# Patient Record
Sex: FEMALE | Race: OTHER | NOT HISPANIC OR LATINO | URBAN - METROPOLITAN AREA
[De-identification: names, ages, dates, MRNs, and addresses within clinical notes are randomized per-mention and may not be internally consistent; named-entity substitution may affect disease eponyms.]

---

## 2019-06-01 ENCOUNTER — INPATIENT (INPATIENT)
Facility: HOSPITAL | Age: 74
LOS: 6 days | Discharge: HOME | DRG: 247 | End: 2019-06-08
Attending: INTERNAL MEDICINE | Admitting: HOSPITALIST
Payer: MEDICARE

## 2019-06-01 VITALS
DIASTOLIC BLOOD PRESSURE: 97 MMHG | HEART RATE: 90 BPM | HEIGHT: 62 IN | WEIGHT: 126.99 LBS | SYSTOLIC BLOOD PRESSURE: 185 MMHG | OXYGEN SATURATION: 99 % | RESPIRATION RATE: 17 BRPM | TEMPERATURE: 98 F

## 2019-06-01 LAB
ALBUMIN SERPL ELPH-MCNC: 4.7 G/DL — SIGNIFICANT CHANGE UP (ref 3.3–5)
ALP SERPL-CCNC: 90 U/L — SIGNIFICANT CHANGE UP (ref 40–120)
ALT FLD-CCNC: 17 U/L — SIGNIFICANT CHANGE UP (ref 10–45)
ANION GAP SERPL CALC-SCNC: 16 MMOL/L — SIGNIFICANT CHANGE UP (ref 5–17)
AST SERPL-CCNC: 24 U/L — SIGNIFICANT CHANGE UP (ref 10–40)
BASOPHILS # BLD AUTO: 0 K/UL — SIGNIFICANT CHANGE UP (ref 0–0.2)
BASOPHILS NFR BLD AUTO: 0.2 % — SIGNIFICANT CHANGE UP (ref 0–2)
BILIRUB SERPL-MCNC: 0.4 MG/DL — SIGNIFICANT CHANGE UP (ref 0.2–1.2)
BUN SERPL-MCNC: 14 MG/DL — SIGNIFICANT CHANGE UP (ref 7–23)
CALCIUM SERPL-MCNC: 10.2 MG/DL — SIGNIFICANT CHANGE UP (ref 8.4–10.5)
CHLORIDE SERPL-SCNC: 101 MMOL/L — SIGNIFICANT CHANGE UP (ref 96–108)
CO2 SERPL-SCNC: 24 MMOL/L — SIGNIFICANT CHANGE UP (ref 22–31)
CREAT SERPL-MCNC: 0.81 MG/DL — SIGNIFICANT CHANGE UP (ref 0.5–1.3)
EOSINOPHIL # BLD AUTO: 0.2 K/UL — SIGNIFICANT CHANGE UP (ref 0–0.5)
EOSINOPHIL NFR BLD AUTO: 3.3 % — SIGNIFICANT CHANGE UP (ref 0–6)
GLUCOSE SERPL-MCNC: 117 MG/DL — HIGH (ref 70–99)
HCT VFR BLD CALC: 44.8 % — SIGNIFICANT CHANGE UP (ref 34.5–45)
HGB BLD-MCNC: 15.3 G/DL — SIGNIFICANT CHANGE UP (ref 11.5–15.5)
LYMPHOCYTES # BLD AUTO: 2.6 K/UL — SIGNIFICANT CHANGE UP (ref 1–3.3)
LYMPHOCYTES # BLD AUTO: 34.6 % — SIGNIFICANT CHANGE UP (ref 13–44)
MCHC RBC-ENTMCNC: 31.2 PG — SIGNIFICANT CHANGE UP (ref 27–34)
MCHC RBC-ENTMCNC: 34.1 GM/DL — SIGNIFICANT CHANGE UP (ref 32–36)
MCV RBC AUTO: 91.7 FL — SIGNIFICANT CHANGE UP (ref 80–100)
MONOCYTES # BLD AUTO: 0.4 K/UL — SIGNIFICANT CHANGE UP (ref 0–0.9)
MONOCYTES NFR BLD AUTO: 5.8 % — SIGNIFICANT CHANGE UP (ref 2–14)
NEUTROPHILS # BLD AUTO: 4.2 K/UL — SIGNIFICANT CHANGE UP (ref 1.8–7.4)
NEUTROPHILS NFR BLD AUTO: 56.1 % — SIGNIFICANT CHANGE UP (ref 43–77)
PLATELET # BLD AUTO: 212 K/UL — SIGNIFICANT CHANGE UP (ref 150–400)
POTASSIUM SERPL-MCNC: 3.8 MMOL/L — SIGNIFICANT CHANGE UP (ref 3.5–5.3)
POTASSIUM SERPL-SCNC: 3.8 MMOL/L — SIGNIFICANT CHANGE UP (ref 3.5–5.3)
PROT SERPL-MCNC: 8.5 G/DL — HIGH (ref 6–8.3)
RBC # BLD: 4.89 M/UL — SIGNIFICANT CHANGE UP (ref 3.8–5.2)
RBC # FLD: 12.2 % — SIGNIFICANT CHANGE UP (ref 10.3–14.5)
SODIUM SERPL-SCNC: 141 MMOL/L — SIGNIFICANT CHANGE UP (ref 135–145)
TROPONIN T, HIGH SENSITIVITY RESULT: 12 NG/L — SIGNIFICANT CHANGE UP (ref 0–51)
WBC # BLD: 7.5 K/UL — SIGNIFICANT CHANGE UP (ref 3.8–10.5)
WBC # FLD AUTO: 7.5 K/UL — SIGNIFICANT CHANGE UP (ref 3.8–10.5)

## 2019-06-01 PROCEDURE — 99285 EMERGENCY DEPT VISIT HI MDM: CPT | Mod: 25

## 2019-06-01 NOTE — ED PROVIDER NOTE - OBJECTIVE STATEMENT
75 yo female with hx of HTN HLD presenting with elevated blood pressure associated with tightness feeling in the neck associated with bilateral arm heaviness x 2 weeks. intermittent, randomly comes and goes.  patient was at [facility] and felt sensation today. +family hx of heart disease in mothers side.  currently asymptomatic.  after dinner today, pressure was 185 systolic which prompted her to come in.

## 2019-06-01 NOTE — ED ADULT NURSE NOTE - CHPI ED NUR SYMPTOMS NEG
no shortness of breath/no vomiting/no diaphoresis/no chest pain/no dizziness/no congestion/no syncope/no nausea/no back pain/no chills/no fever

## 2019-06-01 NOTE — ED PROVIDER NOTE - NS ED ROS FT
Constitutional: no fever  Eyes: no conjunctivitis  Ears: no ear pain   Nose: no nasal congestion, Mouth/Throat: +throat discomfort, Neck: no stiffness  Cardiovascular: no chest pain  Chest: no cough  Gastrointestinal: no abdominal pain, no vomiting and diarrhea  MSK: no joint pain  : no dysuria  Skin: no rash  Neuro: no LOC

## 2019-06-01 NOTE — ED ADULT NURSE NOTE - OBJECTIVE STATEMENT
75 yo F pmh of HTN dx 1 month ago, placed on losartan, metoprolol, hydrochlorothiazide ambulated to ED c/o HTN this morning.  As per pt, BP has been controlled with medication, but did not take normal dose this morning, took her AM medications later.  Pt states that she has a sensation of tightness on her neck and down her arms bilaterally since taking AM medications.   Denies HA, CP, back pain, SOB, fevers/chills, n/v/d, lightheadedness, dizziness, changes in vision, numbness, tingling, changes in urinary or bowel habits.  A&Ox4,  gross neuro intact, VSS, skin w/d/i.  Safety and comfort maintained. Family present at bedside. Will continue to monitor.

## 2019-06-01 NOTE — ED ADULT NURSE NOTE - NSIMPLEMENTINTERV_GEN_ALL_ED
Implemented All Universal Safety Interventions:  [place] to call system. Call bell, personal items and telephone within reach. Instruct patient to call for assistance. Room bathroom lighting operational. Non-slip footwear when patient is off stretcher. Physically safe environment: no spills, clutter or unnecessary equipment. Stretcher in lowest position, wheels locked, appropriate side rails in place.

## 2019-06-01 NOTE — ED PROVIDER NOTE - CLINICAL SUMMARY MEDICAL DECISION MAKING FREE TEXT BOX
73 yo female presenting with elevated high blood pressure associated with intermittent throat discomfort; currently asymptomatic;  will check labs troponin ekg to rule out signs of end organ damage

## 2019-06-01 NOTE — ED PROVIDER NOTE - PROGRESS NOTE DETAILS
Was asked to evaluate patient for CDU for stress this morning, pt troponins with elevation from 12-22, EKG nonischemic, discussed case with noctmaria luisaist Florence who recommended repeat trop 3 hrs after last trop to trnd, possible elevation from htn. discussed with resident Nubia. -Jannette Jones PA-C Spoke with Dr. Henriquez in regards to 3rd troponin back at 28, states pt unlikely to have stress today, most likely will be done on monday, or 24 hrs after first down trending troponin, does not recommend heparin at this time. discussed with resident Nubia. -Jannette Jones PA-C

## 2019-06-02 DIAGNOSIS — Z29.9 ENCOUNTER FOR PROPHYLACTIC MEASURES, UNSPECIFIED: ICD-10-CM

## 2019-06-02 DIAGNOSIS — E78.5 HYPERLIPIDEMIA, UNSPECIFIED: ICD-10-CM

## 2019-06-02 DIAGNOSIS — I16.0 HYPERTENSIVE URGENCY: ICD-10-CM

## 2019-06-02 DIAGNOSIS — R74.8 ABNORMAL LEVELS OF OTHER SERUM ENZYMES: ICD-10-CM

## 2019-06-02 DIAGNOSIS — I21.4 NON-ST ELEVATION (NSTEMI) MYOCARDIAL INFARCTION: ICD-10-CM

## 2019-06-02 DIAGNOSIS — Z98.890 OTHER SPECIFIED POSTPROCEDURAL STATES: Chronic | ICD-10-CM

## 2019-06-02 LAB
TROPONIN T, HIGH SENSITIVITY RESULT: 18 NG/L — SIGNIFICANT CHANGE UP (ref 0–51)
TROPONIN T, HIGH SENSITIVITY RESULT: 20 NG/L — SIGNIFICANT CHANGE UP (ref 0–51)
TROPONIN T, HIGH SENSITIVITY RESULT: 22 NG/L — SIGNIFICANT CHANGE UP (ref 0–51)
TROPONIN T, HIGH SENSITIVITY RESULT: 28 NG/L — SIGNIFICANT CHANGE UP (ref 0–51)

## 2019-06-02 PROCEDURE — 99223 1ST HOSP IP/OBS HIGH 75: CPT | Mod: AI,GC

## 2019-06-02 PROCEDURE — 99223 1ST HOSP IP/OBS HIGH 75: CPT

## 2019-06-02 RX ORDER — ASPIRIN/CALCIUM CARB/MAGNESIUM 324 MG
162 TABLET ORAL ONCE
Refills: 0 | Status: COMPLETED | OUTPATIENT
Start: 2019-06-02 | End: 2019-06-02

## 2019-06-02 RX ORDER — ASPIRIN/CALCIUM CARB/MAGNESIUM 324 MG
81 TABLET ORAL DAILY
Refills: 0 | Status: DISCONTINUED | OUTPATIENT
Start: 2019-06-02 | End: 2019-06-08

## 2019-06-02 RX ORDER — ATORVASTATIN CALCIUM 80 MG/1
20 TABLET, FILM COATED ORAL AT BEDTIME
Refills: 0 | Status: DISCONTINUED | OUTPATIENT
Start: 2019-06-02 | End: 2019-06-06

## 2019-06-02 RX ORDER — HYDROCHLOROTHIAZIDE 25 MG
25 TABLET ORAL DAILY
Refills: 0 | Status: DISCONTINUED | OUTPATIENT
Start: 2019-06-02 | End: 2019-06-04

## 2019-06-02 RX ORDER — LOSARTAN POTASSIUM 100 MG/1
100 TABLET, FILM COATED ORAL DAILY
Refills: 0 | Status: DISCONTINUED | OUTPATIENT
Start: 2019-06-02 | End: 2019-06-08

## 2019-06-02 RX ADMIN — ATORVASTATIN CALCIUM 20 MILLIGRAM(S): 80 TABLET, FILM COATED ORAL at 21:51

## 2019-06-02 RX ADMIN — Medication 25 MILLIGRAM(S): at 12:57

## 2019-06-02 RX ADMIN — Medication 162 MILLIGRAM(S): at 05:14

## 2019-06-02 RX ADMIN — Medication 81 MILLIGRAM(S): at 12:57

## 2019-06-02 RX ADMIN — LOSARTAN POTASSIUM 100 MILLIGRAM(S): 100 TABLET, FILM COATED ORAL at 12:57

## 2019-06-02 NOTE — H&P ADULT - NSHPSOCIALHISTORY_GEN_ALL_CORE
Patient is a retired nurse. She lives with her . She denies any history of smoking or recreational drug use. Social alcohol use.

## 2019-06-02 NOTE — H&P ADULT - NSHPREVIEWOFSYSTEMS_GEN_ALL_CORE
REVIEW OF SYSTEMS    CONSTITUTIONAL:  Denies fevers or chills   HEENT: Denies vision changes or nasal congestion   SKIN:  Denies rash or itching.  CARDIOVASCULAR:  Denies chest pain, DENITA  RESPIRATORY:  Denies shortness of breath, cough or sputum.  GASTROINTESTINAL:  Denies abdominal pain, nausea, vomiting, or diarrhea   GENITOURINARY:  Denies any hematuria, dysuria  NEUROLOGICAL:  Denies headache, dizziness, or near syncope, no weakness or numbness  MUSCULOSKELETAL:  Denies muscle or back pain   HEMATOLOGIC:  Denies anemia, bleeding or bruising.  LYMPHATICS:  Denies enlarged nodes.

## 2019-06-02 NOTE — ED ADULT NURSE REASSESSMENT NOTE - NS ED NURSE REASSESS COMMENT FT1
Recvd pt awake, alert and responsive to all stimuli.  no sob or respiratory distress noted.  pt remains hypertensive but denies any pain or dizziness at this time.  pt awaiting admitting tele bed with cardiac monitor in place.  will continue to monitor.

## 2019-06-02 NOTE — H&P ADULT - PROBLEM SELECTOR PLAN 2
- trops have trended from 12 --> 22 --> 28 --> 20  - see by cardiology Dr. Henriquez who thinks troponin is demand secondary to uncontrolled hypertension  - TTE ordered to evaluate LF function  - plans for stress tomorrow   - EKG without signs of active ischemia  - monitor on telemetry for now trops have trended from 12 --> 22 --> 28 --> 20  - seen by cardiology Dr. Henriquez who thinks troponin is demand secondary to uncontrolled hypertension  - TTE ordered to evaluate LF function  - plans for MPI tomorrow per Dr. Henriquez versus CT coronaries if unable to complete MPI   - EKG without signs of active ischemia  - monitor on telemetry for now - trops have trended from 12 --> 22 --> 28 --> 20 but did present with atypical chest pain with uptrending troponins   - seen by cardiology Dr. Henriquez who thinks troponin is demand secondary to uncontrolled hypertension  - TTE ordered to evaluate LF function  - will order CT coronaries and pending results will likely require cath on this admission   - EKG without signs of active ischemia   - monitor on telemetry for now

## 2019-06-02 NOTE — CONSULT NOTE ADULT - SUBJECTIVE AND OBJECTIVE BOX
MRN-[id number]    CHIEF COMPLAINT:  CP    HISTORY OF PRESENT ILLNESS:  YANIV JAQUEZ is a 74y Female patient with past medical history of HTN, HLD presenting with significant hypertension systolics >180s associated with chest aching. She missed her morning antihypertensive medications yesterday morning. She was recently diagnosed with hypertension late April 2019 and prescribed losartan 100 mg daily, HCTZ 12.5 mg daily, metoprolol succinate 25 mg daily with good control in the range of systolics 120-140s. Given her symptoms of chest discomfort, she was planned to see a cardiologist in the outpatient setting. hs-troponin 12, 22, 28. ECG benign. Asymptomatic at the time of evaluation other than being anxious about the whole ordeal. Cardiology consulted for further management.    Allergies  No Known Allergies    PAST MEDICAL & SURGICAL HISTORY:  Hypertension    FAMILY HISTORY:  No sudden premature cardiac death.     SOCIAL HISTORY:    Non-smoker    REVIEW OF SYSTEMS:  CONSTITUTIONAL: No fever, weight loss, or fatigue  EYES: No eye pain, visual disturbances  ENMT:  No difficulty hearing, tinnitus, vertigo  NECK: No pain or stiffness  RESPIRATORY: No cough, wheezing, chills. No SOB.   CARDIOVASCULAR: No palpitations, passing out, dizziness, or leg swelling. Positive chest aching.   GASTROINTESTINAL: No abdominal or epigastric pain. No nausea, vomiting, or hematemesis  NEUROLOGICAL: No headaches  SKIN: No itching, burning, rashes,  LYMPH Nodes: No enlarged glands  MUSCULOSKELETAL: No joint pain or swelling  PSYCHIATRIC: No depression, anxiety  HEME/LYMPH: No easy bruising    PHYSICAL EXAM:  Vital Signs Last 24 Hrs  T(C): 37 (02 Jun 2019 07:15), Max: 37 (01 Jun 2019 22:29)  T(F): 98.6 (02 Jun 2019 07:15), Max: 98.6 (01 Jun 2019 22:29)  HR: 51 (02 Jun 2019 07:15) (51 - 90)  BP: 192/82 (02 Jun 2019 07:15) (169/85 - 192/82)  RR: 16 (02 Jun 2019 07:15) (16 - 18)  SpO2: 98% (02 Jun 2019 07:15) (98% - 99%)    Appearance: Normal	  HEENT:   Normal oral mucosa  Lymphatic: No lymphadenopathy  Cardiovascular: Normal S1 S2, No edema  Respiratory: Lungs clear to auscultation	  Psychiatry: A & O x 3  Gastrointestinal:  Soft, Non-tender  Skin: No rashes, No ecchymoses	  Neurologic: Non-focal  Extremities: No clubbing, cyanosis or edema  Vascular: Peripheral pulses palpable 2+ bilaterally    LABS:	 	  CBC Full  -  ( 01 Jun 2019 23:07 )  WBC Count : 7.5 K/uL  Hemoglobin : 15.3 g/dL  Hematocrit : 44.8 %  Platelet Count - Automated : 212 K/uL  Mean Cell Volume : 91.7 fl  Mean Cell Hemoglobin : 31.2 pg  Mean Cell Hemoglobin Concentration : 34.1 gm/dL  Auto Neutrophil # : 4.2 K/uL  Auto Lymphocyte # : 2.6 K/uL  Auto Monocyte # : 0.4 K/uL  Auto Eosinophil # : 0.2 K/uL  Auto Basophil # : 0.0 K/uL  Auto Neutrophil % : 56.1 %  Auto Lymphocyte % : 34.6 %  Auto Monocyte % : 5.8 %  Auto Eosinophil % : 3.3 %  Auto Basophil % : 0.2 %    06-01    141  |  101  |  14  ----------------------------<  117<H>  3.8   |  24  |  0.81    Ca    10.2      01 Jun 2019 23:07    TPro  8.5<H>  /  Alb  4.7  /  TBili  0.4  /  DBili  x   /  AST  24  /  ALT  17  /  AlkPhos  90  06-01    TELEMETRY: 6/1/2019  NSR    CARDIAC TESTING/STUDIES:    N/A  	  ASSESSMENT/PLAN: 	   74y Female patient with past medical history of HTN, HLD presenting with hypertensive emergency and type II NSTEMI.     1) Type II NSTEMI   Secondary to uncontrolled HTN; symptoms resolve with improved BP.   CAD risk factors: HTN, HLD, age, family history     Pending ECHO to assess LEF, WMA, diastology, chamber quantification, valve pathology, etc.   Trend hs-troponin until peaked; nearly peaked now.   Ischemic evaluation planned; recommend pharmacologic MPI ususally 24-48 hours after the first downtrending troponin. Alternatively, despite her age, I do think she would be a good candidate for CTA coronaries if MPI is not done since she has a slow HR.   Continue medical management with aspirin 81 mg daily and atorvastatin 20 mg nightly.     2) HTN   Well controlled when on home medications.   Anginal pain with elevated pressures.     ·	Continue losartan 100 mg daily, HCTZ 12.5-25 mg daily +/- metoprolol succinate 25 mg daily (as she reports acid reflux and reports her PCP changed this medications recently). In any case, BB is not optimal BP medications.     3) HLD   ASCVD risk >7.5%    ·	Continue medical management with atorvastatin 20 mg nightly.     Florence Henriquez MD, MPH, ITZ  Cardiovascular Specialist Attending  [facility], [place]  C: [phone]  E: [email or web address]  (Cardiology Nocturnist cell number available 7 pm - 7 am every night; available daytime week days for follow-up only; daytime weekends covered by general cardiology consult service) MRN-[id number]    CHIEF COMPLAINT:  CP    HISTORY OF PRESENT ILLNESS:  YANIV JAQUEZ is a 74y Female patient with past medical history of HTN, HLD presenting with significant hypertension systolics >180s associated with chest aching. She missed her morning antihypertensive medications yesterday morning. She was recently diagnosed with hypertension late April 2019 and prescribed losartan 100 mg daily, HCTZ 12.5 mg daily, metoprolol succinate 25 mg daily with good control in the range of systolics 120-140s. Given her symptoms of chest discomfort, she was planned to see a cardiologist in the outpatient setting. hs-troponin 12, 22, 28. ECG benign. Asymptomatic at the time of evaluation other than being anxious about the whole ordeal. Cardiology consulted for further management.    Allergies  No Known Allergies    PAST MEDICAL & SURGICAL HISTORY:  Hypertension    FAMILY HISTORY:  No sudden premature cardiac death.     SOCIAL HISTORY:    Non-smoker    REVIEW OF SYSTEMS:  CONSTITUTIONAL: No fever, weight loss, or fatigue  EYES: No eye pain, visual disturbances  ENMT:  No difficulty hearing, tinnitus, vertigo  NECK: No pain or stiffness  RESPIRATORY: No cough, wheezing, chills. No SOB.   CARDIOVASCULAR: No palpitations, passing out, dizziness, or leg swelling. Positive chest aching.   GASTROINTESTINAL: No abdominal or epigastric pain. No nausea, vomiting, or hematemesis  NEUROLOGICAL: No headaches  SKIN: No itching, burning, rashes,  LYMPH Nodes: No enlarged glands  MUSCULOSKELETAL: No joint pain or swelling  PSYCHIATRIC: No depression, anxiety  HEME/LYMPH: No easy bruising    PHYSICAL EXAM:  Vital Signs Last 24 Hrs  T(C): 37 (02 Jun 2019 07:15), Max: 37 (01 Jun 2019 22:29)  T(F): 98.6 (02 Jun 2019 07:15), Max: 98.6 (01 Jun 2019 22:29)  HR: 51 (02 Jun 2019 07:15) (51 - 90)  BP: 192/82 (02 Jun 2019 07:15) (169/85 - 192/82)  RR: 16 (02 Jun 2019 07:15) (16 - 18)  SpO2: 98% (02 Jun 2019 07:15) (98% - 99%)    Appearance: Normal	  HEENT:   Normal oral mucosa  Lymphatic: No lymphadenopathy  Cardiovascular: Normal S1 S2, No edema  Respiratory: Lungs clear to auscultation	  Psychiatry: A & O x 3  Gastrointestinal:  Soft, Non-tender  Skin: No rashes, No ecchymoses	  Neurologic: Non-focal  Extremities: No clubbing, cyanosis or edema  Vascular: Peripheral pulses palpable 2+ bilaterally    LABS:	 	  CBC Full  -  ( 01 Jun 2019 23:07 )  WBC Count : 7.5 K/uL  Hemoglobin : 15.3 g/dL  Hematocrit : 44.8 %  Platelet Count - Automated : 212 K/uL  Mean Cell Volume : 91.7 fl  Mean Cell Hemoglobin : 31.2 pg  Mean Cell Hemoglobin Concentration : 34.1 gm/dL  Auto Neutrophil # : 4.2 K/uL  Auto Lymphocyte # : 2.6 K/uL  Auto Monocyte # : 0.4 K/uL  Auto Eosinophil # : 0.2 K/uL  Auto Basophil # : 0.0 K/uL  Auto Neutrophil % : 56.1 %  Auto Lymphocyte % : 34.6 %  Auto Monocyte % : 5.8 %  Auto Eosinophil % : 3.3 %  Auto Basophil % : 0.2 %    06-01    141  |  101  |  14  ----------------------------<  117<H>  3.8   |  24  |  0.81    Ca    10.2      01 Jun 2019 23:07    TPro  8.5<H>  /  Alb  4.7  /  TBili  0.4  /  DBili  x   /  AST  24  /  ALT  17  /  AlkPhos  90  06-01    TELEMETRY: 6/1/2019  NSR    CARDIAC TESTING/STUDIES:    N/A  	  ASSESSMENT/PLAN: 	   74y Female patient with past medical history of HTN, HLD presenting with hypertensive emergency and type II NSTEMI.     1) Type II NSTEMI   Secondary to uncontrolled HTN; symptoms resolve with improved BP.   CAD risk factors: HTN, HLD, age, family history     ·	Pending ECHO to assess LEF, WMA, diastology, chamber quantification, valve pathology, etc.   ·	Trend hs-troponin until peaked; nearly peaked now.   ·	Ischemic evaluation planned; recommend pharmacologic MPI ususally 24-48 hours after the first downtrending troponin. Alternatively, despite her age, I do think she would be a good candidate for CTA coronaries if MPI is not done since she has a slow HR.   ·	Continue medical management with aspirin 81 mg daily and atorvastatin 20 mg nightly.     2) HTN   Well controlled when on home medications.   Anginal pain with elevated pressures.     ·	Continue losartan 100 mg daily, HCTZ 12.5-25 mg daily +/- metoprolol succinate 25 mg daily (as she reports acid reflux and reports her PCP changed this medications recently). In any case, BB is not optimal BP medications.     3) HLD   ASCVD risk >7.5%    ·	Continue medical management with atorvastatin 20 mg nightly.     Florence Henriquez MD, MPH, ITZ  Cardiovascular Specialist Attending  [facility]  C: [phone]  E: [email or web address]  (Cardiology Nocturnist cell number available 7 pm - 7 am every night; available daytime week days for follow-up only; daytime weekends covered by general cardiology consult service)

## 2019-06-02 NOTE — H&P ADULT - HISTORY OF PRESENT ILLNESS
73 y/o F with a PMH significant for recently diagnosed HTN and HLD who presents to the hospital with high blood pressure. The patient states she was diagnosed with HTN last month after presenting to her PCPs office and found to be hypertensive to the 180s with 150s on a manual BP cuff. She was started on losartan-HCTZ, metoprolol, ASA 81, and atorvastatin at that time. She states that she intermittently doesn't feel well and feels "throat pressure" and so her PCP recently changed her metoprolol to a different medication but she is unsure what it is as she was supposed to pick it up yesterday. She states that she forgot to take her blood pressure medication yesterday morning and in the evening she wasn't feeling well so she checked her BP and it was 180 systolic. She took her BP medications and rechecked her BP and it was 200 so she came to the hospital for further evaluation. She says that her BP being so high makes her anxious. She denied chest pain, headache, visual changes, SOB, epigastric pain, nausea, or vomiting. She said she's been having some aching pain in her throat that spreads to her arms and shoulders bilaterally for the last few weeks.     Upon arrival to the ED, T: 98.3, BP: 185/97, HR: 90, RR: 17 sating 99% on RA. She was given ASA 162mg in the ED and seen by cardiology.

## 2019-06-02 NOTE — PROVIDER CONTACT NOTE (OTHER) - SITUATION
Patient's HR went down to 40's and Rhythm was Mobitz 1 at that time . Patent's rhythm was back to NSR with first degree AV Block,

## 2019-06-02 NOTE — H&P ADULT - NSHPLABSRESULTS_GEN_ALL_CORE
LABORATORY                          15.3   7.5   )-----------( 212      ( 01 Jun 2019 23:07 )             44.8       06-01    141  |  101  |  14  ----------------------------<  117<H>  3.8   |  24  |  0.81    Ca    10.2      01 Jun 2019 23:07    TPro  8.5<H>  /  Alb  4.7  /  TBili  0.4  /  DBili  x   /  AST  24  /  ALT  17  /  AlkPhos  90  06-01      Troponin T, High Sensitivity (06.01.19 @ 23:07)    Troponin T, High Sensitivity Result: 12:     Troponin T, High Sensitivity (06.02.19 @ 01:12)    Troponin T, High Sensitivity Result: 22:     Troponin T, High Sensitivity (06.02.19 @ 03:36)    Troponin T, High Sensitivity Result: 28:    Troponin T, High Sensitivity (06.02.19 @ 13:37)    Troponin T, High Sensitivity Result: 20:     EKG: NSR at 68 bpm, prolonged RI interval at 238 bpm, T wave inversions in leads II, III LABORATORY                          15.3   7.5   )-----------( 212      ( 01 Jun 2019 23:07 )             44.8       06-01    141  |  101  |  14  ----------------------------<  117<H>  3.8   |  24  |  0.81    Ca    10.2      01 Jun 2019 23:07    TPro  8.5<H>  /  Alb  4.7  /  TBili  0.4  /  DBili  x   /  AST  24  /  ALT  17  /  AlkPhos  90  06-01      Troponin T, High Sensitivity (06.01.19 @ 23:07)    Troponin T, High Sensitivity Result: 12:     Troponin T, High Sensitivity (06.02.19 @ 01:12)    Troponin T, High Sensitivity Result: 22:     Troponin T, High Sensitivity (06.02.19 @ 03:36)    Troponin T, High Sensitivity Result: 28:    Troponin T, High Sensitivity (06.02.19 @ 13:37)    Troponin T, High Sensitivity Result: 20:     EKG: NSR at 68 bpm, prolonged AK interval at 238 bpm, T wave inversions in leads aVR, V1 and V2, 1mm ST elevation in leads III and aVF

## 2019-06-02 NOTE — H&P ADULT - NSHPPHYSICALEXAM_GEN_ALL_CORE
Vital Signs Last 24 Hrs  T(C): 37.2 (02 Jun 2019 13:36), Max: 37.2 (02 Jun 2019 13:36)  T(F): 99 (02 Jun 2019 13:36), Max: 99 (02 Jun 2019 13:36)  HR: 54 (02 Jun 2019 13:36) (51 - 90)  BP: 175/77 (02 Jun 2019 13:36) (169/85 - 195/97)  BP(mean): --  RR: 18 (02 Jun 2019 13:36) (16 - 18)  SpO2: 97% (02 Jun 2019 13:36) (97% - 99%)    PHYSICAL EXAM:    GENERAL: Comfortable, no acute distress   HEAD:  Normocephalic, atraumatic  EYES: EOMI, PERRLA  HEENT: Moist mucous membranes  NECK: Supple, No JVD  NERVOUS SYSTEM: AAOx3, CN II-XII in tact, strength 5/5 in upper and lower extremities bilaterally, sensation in tact   CHEST/LUNG: Clear to auscultation bilaterally  HEART: Regular rate and rhythm, no murmur   ABDOMEN: +BS, soft, non tender, non distended   EXTREMITIES:   No clubbing, cyanosis, or edema  MUSCULOSKELETAL: No muscle tenderness, no joint tenderness  SKIN: warm and dry, no rash

## 2019-06-02 NOTE — H&P ADULT - PROBLEM SELECTOR PLAN 1
- patient initially presented with elevated BP > 180 systolic but denying symptoms of headache, vision changes, or CP  - did have mild elevation in troponin that has downtrended and is likely secondary to deman  - continue to treat BP with home oral medications including losartan 100mg, HCTZ 12.5mg  - per patient her home metoprolol was switched to another agent but patient does not know the name --> will need to call pharmacy tomorrow to determine what agent and can initiate it as well for better control  - can also uptitrate HCTZ as needed - patient initially presented with elevated BP > 180 systolic but denying symptoms of headache, vision changes, or CP  - did have mild elevation in troponin that has downtrended and is likely secondary to demand ischemia vs NSTEMI  - continue to treat BP with home oral medications including losartan 100mg, HCTZ 12.5mg  - per patient her home metoprolol was switched to another agent but patient does not know the name --> will need to call pharmacy tomorrow to determine what agent and can initiate it as well for better control  - can also uptitrate HCTZ as needed

## 2019-06-02 NOTE — CHART NOTE - NSCHARTNOTEFT_GEN_A_CORE
MEDICINE NP     YANIV JAQUEZ  74y Female  Patient is a 74y old  Female who presents with a chief complaint of elevated blood pressure     Event Summary:  notified by ER nurse that patient had an episode of SR -> SB -> HR 30's  with Mobitz type 1 then returned to NSR   Patient seen at bedside  Patient seen and evaluated at bedside, currently denies c/o CP, palpitation, or SOB   however, states walk to the BR and  felt weak, but not as if she was going to pass out.  States episode lasted   as few minutes which subsided on its own.  She denies any N/V or dizziness.        Vital Signs Last 24 Hrs  T(C): 36.9 (02 Jun 2019 15:48), Max: 37.2 (02 Jun 2019 13:36)  T(F): 98.4 (02 Jun 2019 15:48), Max: 99 (02 Jun 2019 13:36)  HR: 60 (02 Jun 2019 15:48) (51 - 90)  BP: 164/77 (02 Jun 2019 15:48) (164/77 - 195/97)  BP(mean): --  RR: 18 (02 Jun 2019 15:48) (16 - 18)  SpO2: 93% (02 Jun 2019 15:48) (93% - 99%)    Neuro: Alert and oriented x 2  CV : S1S2 RVR, 2+ pp. LE no edema   Pulmonary:  Lungs clear b/l  GI ; Soft, NT, ND + BS x 4 quads  : voiding     HPI: 73 y/o F with a PMH significant for recently diagnosed HTN and HLD who presents to the hospital with high blood pressure. The patient states she was diagnosed with HTN last month after presenting to her PCPs office and found to be hypertensive to the 180s with 150s on a manual BP cuff. She was started on losartan-HCTZ, metoprolol, ASA 81, and atorvastatin at that time. She states that she intermittently doesn't feel well and feels "throat pressure" and so her PCP recently changed her metoprolol to a different medication but she is unsure what it is as she was supposed to pick it up yesterday. She states that she forgot to take her blood pressure medication yesterday morning and in the evening she wasn't feeling well so she checked her BP and it was 180 systolic. She took her BP medications and rechecked her BP and it was 200 so she came to the hospital for further evaluation. She says that her BP being so high makes her anxious.   Patient  now with episode of bradycardia HR 30's and Mobitz 1      Plan:  # 1 Bradycarda               Debbie Bishop, DNP-C  Medicine Department MEDICINE NP     YANIV JAQUEZ  74y Female  Patient is a 74y old  Female who presents with a chief complaint of elevated blood pressure     Event Summary:  notified by ER nurse that patient had an episode of SR -> SB -> HR 30's  with Mobitz type 1 then returned to NSR   Patient seen at bedside  Patient seen and evaluated at bedside, currently denies c/o CP, palpitation, or SOB   however, states walk to the BR and  felt weak, but not as if she was going to pass out.  States episode lasted   as few minutes which subsided on its own.  She denies any N/V or dizziness.        Vital Signs Last 24 Hrs  T(C): 36.9 (02 Jun 2019 15:48), Max: 37.2 (02 Jun 2019 13:36)  T(F): 98.4 (02 Jun 2019 15:48), Max: 99 (02 Jun 2019 13:36)  HR: 60 (02 Jun 2019 15:48) (51 - 90)  BP: 164/77 (02 Jun 2019 15:48) (164/77 - 195/97)  BP(mean): --  RR: 18 (02 Jun 2019 15:48) (16 - 18)  SpO2: 93% (02 Jun 2019 15:48) (93% - 99%)    Neuro: Alert and oriented x 2  CV : S1S2 RVR, 2+ pp. LE no edema   Pulmonary:  Lungs clear b/l  GI ; Soft, NT, ND + BS x 4 quads  : voiding     HPI: 75 y/o F with a PMH significant for recently diagnosed HTN and HLD who presents to the hospital with high blood pressure. The patient states she was diagnosed with HTN last month after presenting to her PCPs office and found to be hypertensive to the 180s with 150s on a manual BP cuff. She was started on losartan-HCTZ, metoprolol, ASA 81, and atorvastatin at that time. She states that she intermittently doesn't feel well and feels "throat pressure" and so her PCP recently changed her metoprolol to a different medication but she is unsure what it is as she was supposed to pick it up yesterday. She states that she forgot to take her blood pressure medication yesterday morning and in the evening she wasn't feeling well so she checked her BP and it was 180 systolic. She took her BP medications and rechecked her BP and it was 200 so she came to the hospital for further evaluation. She says that her BP being so high makes her anxious.   Patient  now with episode of bradycardia HR 30's and Mobitz 1      Plan:  # 1 Bradycarda       - EKG noted T wave abnormality       - check troponin level       - check orthostatics x 1         # NSTEMI     scheduled for TTE     Scheduled for CT Coronaries     Will continue to monitor on Tele     Debbie Bishop DNP-C  Medicine Department

## 2019-06-02 NOTE — H&P ADULT - PROBLEM SELECTOR PLAN 4
- chemical DVT ppx not needed given IMPROVE score 1, ambulatory  - DASH diet  - Dispo pending stress test    Ekta Tolentino  PGY 2 DAY ADMIT  PAGER [phone]

## 2019-06-03 DIAGNOSIS — R00.1 BRADYCARDIA, UNSPECIFIED: ICD-10-CM

## 2019-06-03 DIAGNOSIS — R07.9 CHEST PAIN, UNSPECIFIED: ICD-10-CM

## 2019-06-03 DIAGNOSIS — Z29.9 ENCOUNTER FOR PROPHYLACTIC MEASURES, UNSPECIFIED: ICD-10-CM

## 2019-06-03 DIAGNOSIS — I10 ESSENTIAL (PRIMARY) HYPERTENSION: ICD-10-CM

## 2019-06-03 LAB
ANION GAP SERPL CALC-SCNC: 14 MMOL/L — SIGNIFICANT CHANGE UP (ref 5–17)
BUN SERPL-MCNC: 21 MG/DL — SIGNIFICANT CHANGE UP (ref 7–23)
CALCIUM SERPL-MCNC: 10 MG/DL — SIGNIFICANT CHANGE UP (ref 8.4–10.5)
CHLORIDE SERPL-SCNC: 101 MMOL/L — SIGNIFICANT CHANGE UP (ref 96–108)
CO2 SERPL-SCNC: 25 MMOL/L — SIGNIFICANT CHANGE UP (ref 22–31)
CREAT SERPL-MCNC: 1.04 MG/DL — SIGNIFICANT CHANGE UP (ref 0.5–1.3)
GLUCOSE SERPL-MCNC: 104 MG/DL — HIGH (ref 70–99)
HCT VFR BLD CALC: 45.1 % — HIGH (ref 34.5–45)
HCV AB S/CO SERPL IA: 0.12 S/CO — SIGNIFICANT CHANGE UP (ref 0–0.99)
HCV AB SERPL-IMP: SIGNIFICANT CHANGE UP
HGB BLD-MCNC: 14.9 G/DL — SIGNIFICANT CHANGE UP (ref 11.5–15.5)
MAGNESIUM SERPL-MCNC: 2.3 MG/DL — SIGNIFICANT CHANGE UP (ref 1.6–2.6)
MCHC RBC-ENTMCNC: 29.9 PG — SIGNIFICANT CHANGE UP (ref 27–34)
MCHC RBC-ENTMCNC: 33 GM/DL — SIGNIFICANT CHANGE UP (ref 32–36)
MCV RBC AUTO: 90.6 FL — SIGNIFICANT CHANGE UP (ref 80–100)
PLATELET # BLD AUTO: 217 K/UL — SIGNIFICANT CHANGE UP (ref 150–400)
POTASSIUM SERPL-MCNC: 3.5 MMOL/L — SIGNIFICANT CHANGE UP (ref 3.5–5.3)
POTASSIUM SERPL-SCNC: 3.5 MMOL/L — SIGNIFICANT CHANGE UP (ref 3.5–5.3)
RBC # BLD: 4.98 M/UL — SIGNIFICANT CHANGE UP (ref 3.8–5.2)
RBC # FLD: 13 % — SIGNIFICANT CHANGE UP (ref 10.3–14.5)
SODIUM SERPL-SCNC: 140 MMOL/L — SIGNIFICANT CHANGE UP (ref 135–145)
WBC # BLD: 6.7 K/UL — SIGNIFICANT CHANGE UP (ref 3.8–10.5)
WBC # FLD AUTO: 6.7 K/UL — SIGNIFICANT CHANGE UP (ref 3.8–10.5)

## 2019-06-03 PROCEDURE — 99222 1ST HOSP IP/OBS MODERATE 55: CPT

## 2019-06-03 PROCEDURE — 93306 TTE W/DOPPLER COMPLETE: CPT | Mod: 26

## 2019-06-03 PROCEDURE — 99233 SBSQ HOSP IP/OBS HIGH 50: CPT

## 2019-06-03 RX ORDER — LOSARTAN/HYDROCHLOROTHIAZIDE 100MG-25MG
1 TABLET ORAL
Qty: 0 | Refills: 0 | DISCHARGE

## 2019-06-03 RX ORDER — ASPIRIN/CALCIUM CARB/MAGNESIUM 324 MG
1 TABLET ORAL
Qty: 0 | Refills: 0 | DISCHARGE

## 2019-06-03 RX ADMIN — Medication 81 MILLIGRAM(S): at 11:14

## 2019-06-03 RX ADMIN — Medication 25 MILLIGRAM(S): at 06:34

## 2019-06-03 RX ADMIN — ATORVASTATIN CALCIUM 20 MILLIGRAM(S): 80 TABLET, FILM COATED ORAL at 21:04

## 2019-06-03 RX ADMIN — LOSARTAN POTASSIUM 100 MILLIGRAM(S): 100 TABLET, FILM COATED ORAL at 06:34

## 2019-06-03 NOTE — CONSULT NOTE ADULT - ASSESSMENT
75 y/o F w/ PMHx of  recently diagnosed HTN( 5/2/19) and HLD who presents to the hospital with high blood pressure. Patient states she was recently diagnosed w/ HTN and was started on BP medications including lopressor. She states she began experiencing symptom of lightheadedness w/ lopressor and therefore she decided to stop taking her lopressor "3-4" days prior to admission. She contacted her PMD a few days after discontinuing and he switched her medications, however she was unable to get medication filled as she began feeling unwell with symptom of neck discomfort radiating to bilateral shoulders, which she states she had been experiencing for a few weeks and given referral previously for a Cardiologist by her PMD. However now worsening  w/ increased anxiety.  She checked her BP  and noted that it was very high (180).  She decided to take  BP medications and rechecked her BP and it remained elevated ( SBP 200mmhg) so she came to the hospital for further evaluation. Noted w/ two episodes of mobitz type 1 on telemetry. At time of 2nd episode patient w/ symptom of lightheadedness similar to lightheadedness experienced while taking metoprolol ( off metoprolol since approximately 3-4 days prior to admission).    # Symptomatic Mobitz type 2  - Continue w/ ischemic workup per Cardiology  - Continue to hold off on all AVN blockers  - Continue telemetry monitoring  - EP will continue to follow    [phone] 75 y/o F w/ PMHx of  recently diagnosed HTN( 5/2/19) and HLD who presents to the hospital with high blood pressure. Patient states she was recently diagnosed w/ HTN and was started on BP medications including lopressor. She states she began experiencing symptom of lightheadedness w/ lopressor and therefore she decided to stop taking her lopressor "3-4" days prior to admission. She contacted her PMD a few days after discontinuing and he switched her medications, however she was unable to get medication filled as she began feeling unwell with symptom of neck discomfort radiating to bilateral shoulders, which she states she had been experiencing for a few weeks and given referral previously for a Cardiologist by her PMD. However now worsening  w/ increased anxiety.  She checked her BP  and noted that it was very high (180).  She decided to take  BP medications and rechecked her BP and it remained elevated ( SBP 200mmhg) so she came to the hospital for further evaluation. Noted w/ two episodes of mobitz type 1 on telemetry. At time of 2nd episode patient w/ symptom of lightheadedness similar to lightheadedness experienced while taking metoprolol ( off metoprolol since approximately 3-4 days prior to admission).    # Symptomatic second degree AV block type 1   - Continue w/ ischemic workup per Cardiology  - Continue to hold off on all AVN blockers  - Continue telemetry monitoring  - EP will continue to follow    [phone] 75 y/o F w/ PMHx of  recently diagnosed HTN( 5/2/19) and HLD. Admitted w/ Hypertensive urgency after discontinuing her metoprolol for 3-4 days and missing a dose of her Losartan-HCTz and neck pain radiating to bilateral shoulders) . Noted w/ two episodes of mobitz type 1 on telemetry. At time of 2nd episode patient w/ symptom of lightheadedness similar to lightheadedness experienced while taking metoprolol ( off metoprolol since approximately 3-4 days prior to admission).      # Symptomatic second degree AV block type 1   - Continue w/ ischemic workup per Cardiology  - Continue to hold off on all AVN blockers  - Follow up Echo  - Continue telemetry monitoring  - EP will continue to follow    [phone] 73 y/o F w/ PMHx of  recently diagnosed HTN( 5/2/19) and HLD. Admitted w/ Hypertensive urgency, after discontinuing her Metoprolol for 3-4 days and missing a dose of her Losartan-HCTz,  and neck pain radiating to bilateral shoulders). ( note that on day of admission patient admits to taking x1 dose of her Metoprolol and her losartan-HCTz pill in an attempt to decrease her BP after noting it to be very high).  Noted w/ two episodes of mobitz type 1 on telemetry. At time of 2nd episode patient w/ symptom of lightheadedness similar to lightheadedness experienced while taking metoprolol       # Symptomatic second degree AV block type 1   - Continue w/ ischemic workup per Cardiology  - Continue to hold off on all AVN blockers  - Follow up Echo  - Continue telemetry monitoring  - EP will continue to follow    [phone] 75 y/o F w/ PMHx of  recently diagnosed HTN( 5/2/19) and HLD. Admitted w/ Hypertensive urgency, after discontinuing her Metoprolol for 3-4 days and missing a dose of her Losartan-HCTz,  and neck pain radiating to bilateral shoulders). ( note that  patient admits to taking x1 dose of her Metoprolol and her losartan-HCTz pill on day of presentation to ED, 6/1, in an attempt to decrease her BP after noting it to be very high).  Noted w/ two episodes of mobitz type 1 on telemetry. At time of 2nd episode patient w/ symptom of lightheadedness similar to lightheadedness experienced while taking metoprolol       # Symptomatic second degree AV block type 1   - Continue w/ ischemic workup per Cardiology  - Continue to hold off on all AVN blockers  - Follow up Echo  - Continue telemetry monitoring  - EP will continue to follow    [phone] 75 y/o F w/ PMHx of  recently diagnosed HTN( 5/2/19) and HLD. Admitted w/ Hypertensive urgency, after discontinuing her Metoprolol for 3-4 days and missing a dose of her Losartan-HCTz,  and neck pain radiating to bilateral shoulders). ( note that  patient admits to taking x1 dose of her Metoprolol and her losartan-HCTz pill on day of presentation to ED, 6/1, in an attempt to decrease her BP after noting it to be very high).  Noted w/ two episodes of mobitz type 1 on telemetry. At time of 2nd episode patient w/ symptom of lightheadedness similar to lightheadedness experienced while taking metoprolol       # Symptomatic second degree AV block type 1   - Continue w/ ischemic workup per Cardiology; stress test planned   - Continue to hold off on all AVN blockers  - Follow up Echo  - Continue telemetry monitoring  - EP will continue to follow    [phone]

## 2019-06-03 NOTE — PROGRESS NOTE ADULT - ASSESSMENT
75 y/o F with a PMH significant for recently diagnosed HTN and HLD who presents to the hospital with high blood pressure, admitted for hypertensive urgency.

## 2019-06-03 NOTE — PROGRESS NOTE ADULT - SUBJECTIVE AND OBJECTIVE BOX
Patient is a 74y old  Female who presents with a chief complaint of     SUBJECTIVE / OVERNIGHT EVENTS:    patient seen and examined. o/n patient had hR in 30s mobitz type 1. she felt a bit dizzy and overwhelmed. no chest pain, sob, palpitations    ROS:  14 point ROS negative in detail except stated as above    MEDICATIONS  (STANDING):  aspirin  chewable 81 milliGRAM(s) Oral daily  atorvastatin 20 milliGRAM(s) Oral at bedtime  hydrochlorothiazide 25 milliGRAM(s) Oral daily  losartan 100 milliGRAM(s) Oral daily    MEDICATIONS  (PRN):      CAPILLARY BLOOD GLUCOSE        I&O's Summary    03 Jun 2019 07:01  -  03 Jun 2019 11:01  --------------------------------------------------------  IN: 160 mL / OUT: 0 mL / NET: 160 mL        PHYSICAL EXAM:  Vital Signs Last 24 Hrs  T(C): 36.4 (03 Jun 2019 06:36), Max: 37.2 (02 Jun 2019 13:36)  T(F): 97.5 (03 Jun 2019 06:36), Max: 99 (02 Jun 2019 13:36)  HR: 67 (03 Jun 2019 09:09) (54 - 78)  BP: 129/78 (03 Jun 2019 09:09) (121/76 - 195/97)  BP(mean): 91 (03 Jun 2019 01:45) (91 - 91)  RR: 18 (03 Jun 2019 06:36) (18 - 18)  SpO2: 97% (03 Jun 2019 06:36) (93% - 98%)    GENERAL: NAD, well-developed  HEAD:  Atraumatic, Normocephalic  EYES: EOMI, PERRLA, conjunctiva and sclera clear  NECK: Supple, No JVD  CHEST/LUNG: Clear to auscultation bilaterally; No wheeze  HEART: bradycardic s1 s2 No murmurs, rubs, or gallops  ABDOMEN: Soft, Nontender, Nondistended; Bowel sounds present  EXTREMITIES:  2+ Peripheral Pulses, No clubbing, cyanosis, or edema  NEUROLOGY: AAOx3; non-focal  SKIN: No rashes or lesions    LABS:                        14.9   6.70  )-----------( 217      ( 03 Jun 2019 09:05 )             45.1     06-03    140  |  101  |  21  ----------------------------<  104<H>  3.5   |  25  |  1.04    Ca    10.0      03 Jun 2019 07:07  Mg     2.3     06-03    TPro  8.5<H>  /  Alb  4.7  /  TBili  0.4  /  DBili  x   /  AST  24  /  ALT  17  /  AlkPhos  90  06-01              Care Discussed with Consultants/Other Providers:  LALO Win

## 2019-06-03 NOTE — PROGRESS NOTE ADULT - ASSESSMENT
73 y/o F with a PMH significant for recently diagnosed HTN and HLD who presents to the hospital with high blood pressure, admitted for hypertensive urgency.

## 2019-06-03 NOTE — CHART NOTE - NSCHARTNOTEFT_GEN_A_CORE
called by RN that pt. with an episode of mobitz1 hr in the 30's   Pt. seen and examined at bedside   Will keep off beta blockers   EP consult called  cardiology and primary attending made aware.   Will continue to follow and monitor closely on telemetry.  Audelia SABA-BC

## 2019-06-03 NOTE — CONSULT NOTE ADULT - SUBJECTIVE AND OBJECTIVE BOX
75 y/o F w/ PMHx of  recently diagnosed HTN( 5/2/19) and HLD who presents to the hospital with high blood pressure. Patient states she was recently diagnosed w/ HTN and was started on BP medications including lopressor. She states she began experiencing symptom of lightheadedness w/ lopressor and therefore she decided to stop taking her lopressor "3-4" days prior to admission. She contacted her PMD a few days after discontinuing and he switched her medications, however she was unable to get medication filled as she began feeling unwell with symptom of neck discomfort radiating to bilateral shoulders, which she states she had been experiencing for a few weeks and given referral previously for a Cardiologist by her PMD. However now worsening and she became very anxious.  She checked her BP  and noted that it was very high (180).  She decided to take  BP medications and rechecked her BP and it remained elevated ( SBP 200mmhg) so she came to the hospital for further evaluation. Noted w/ two episodes of mobitz type 1 on telemetry. 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   FAMILY HISTORY:  Family history of  MI in Mother &  Maternal Grandfather    MEDICATIONS  (STANDING):  aspirin  chewable 81 milliGRAM(s) Oral daily  atorvastatin 20 milliGRAM(s) Oral at bedtime  hydrochlorothiazide 25 milliGRAM(s) Oral daily  losartan 100 milliGRAM(s) Oral daily    MEDICATIONS  (PRN):      Allergies  No Known Allergies      REVIEW OF SYSTEM:    Constitutional: denies fevers/ fatigue   Neuro: denies headaches/ weakness,/dizziness  Resp: denies shortness of breath  CVS: denies chest pain/ palpitations/ leg swelling  GI: denies abdominal pain/ nausea/vomiting/ diarrhea/ hematochezia  : denies dysuria/ hematuria   Skin: denies rashes/ lesions   Endo: denies cold/heat intolerance  Psych: states gets anxious easily but does not desire medical therapy , has supportive [employer] group.   Heme: denies abnormal bleeds.      Vital Signs Last 24 Hrs  T(C): 36.6 (03 Jun 2019 12:46), Max: 36.6 (02 Jun 2019 22:49)  T(F): 97.9 (03 Jun 2019 12:46), Max: 97.9 (02 Jun 2019 22:49)  HR: 85 (03 Jun 2019 12:46) (56 - 85)  BP: 112/70 (03 Jun 2019 12:46) (112/70 - 165/73)  BP(mean): 91 (03 Jun 2019 01:45) (91 - 91)  RR: 18 (03 Jun 2019 12:46) (18 - 18)  SpO2: 94% (03 Jun 2019 12:46) (94% - 98%)    Physical Exam:  General : well developed, well nourished and in  no acute distress  Neuro : Alert and oriented x 3, no focal deficits  HEENT : Sclera clear, no JVD,  neck supple  Lungs: Breathing nonlabored; clear to auscultation bilaterally.    Cardiovascular : +s 1 +S2, RRR   GI : abdomen soft, NT, ND, + BS   Extremities :+2 bilateral radial pulses.  No pedal edema.   Skin : Normal color and pigmentation w/ no skin lesions noted.         TELE: Sinus 60's-70's           with 2 episodes of mobitz type 1 overnight and this am while awake       LABS:                        14.9   6.70  )-----------( 217      ( 03 Jun 2019 09:05 )             45.1     06-03    140  |  101  |  21  ----------------------------<  104<H>  3.5   |  25  |  1.04    Ca    10.0      03 Jun 2019 07:07  Mg     2.3     06-03    TPro  8.5<H>  /  Alb  4.7  /  TBili  0.4  /  DBili  x   /  AST  24  /  ALT  17  /  AlkPhos  90  06-01          RADIOLOGY & ADDITIONAL STUDIES:  < from: Xray Chest 2 Views PA/Lat (01.19.16 @ 16:41) >  XAM:  RAD CHEST PA LAT        PROCEDURE DATE:  Jan 19 2016     INTERPRETATION:  TIME OF EXAM: January 19, 2016 at 4:40 PM.    CLINICAL INFORMATION: 70-year-old female with rectal bleeding. Chest pain.    TECHNIQUE:   PA and left lateral chest x-ray.    COMPARISON: None available.     INTERPRETATION: The heart is not enlarged.  The mediastinum and harry appear unremarkable. The thoracic aorta is   tortuous.  The lungs are clear.  No pleural effusion or pneumothorax is seen.  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    IMPRESSION: Clear lungs.    No pleural effusion or pneumothorax seen.     JODI DAVIDSON M.D., ATTENDING RADIOLOGIST  This document has been electronically signed. Jan 19 2016  4:47P 73 y/o F w/ PMHx of  recently diagnosed HTNand HLD Admitted w/ hypertensive urgency . Patient states she was recently diagnosed w/ HTN  5/2/19 and was started on BP medications losartan- HCT & lopressor. She states she began experiencing symptom of lightheadedness w/ lopressor and therefore she decided to stop taking her lopressor "3-4" days prior to admission.  She contacted her PMD a few days after discontinuing and he switched her from lopressor to another BP med. However,  she was unable to get medication filled as she began feeling unwell  after also missing "one dose" of her losartan- HCTz with symptom of neck discomfort radiating to bilateral shoulders, which she states she had been experiencing for a few weeks and given referral previously for a Cardiologist by her PMD. However now worsening and she became very anxious.  She checked her BP  and noted that it was very high (180).  She decided to take  BP medications and rechecked her BP and it remained elevated ( SBP 200mmhg) so she came to the hospital for further evaluation. Noted w/ two episodes of mobitz type 1 on telemetry. 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   FAMILY HISTORY:  Family history of  MI in Mother &  Maternal Grandfather    MEDICATIONS  (STANDING):  aspirin  chewable 81 milliGRAM(s) Oral daily  atorvastatin 20 milliGRAM(s) Oral at bedtime  hydrochlorothiazide 25 milliGRAM(s) Oral daily  losartan 100 milliGRAM(s) Oral daily    MEDICATIONS  (PRN):      Allergies  No Known Allergies      REVIEW OF SYSTEM:    Constitutional: denies fevers/ fatigue   Neuro: denies headaches/ weakness,/dizziness  Resp: denies shortness of breath  CVS: denies chest pain/ palpitations/ leg swelling  GI: denies abdominal pain/ nausea/vomiting/ diarrhea/ hematochezia  : denies dysuria/ hematuria   Skin: denies rashes/ lesions   Endo: denies cold/heat intolerance  Psych: states gets anxious easily but does not desire medical therapy , has supportive [employer] group.   Heme: denies abnormal bleeds.      Vital Signs Last 24 Hrs  T(C): 36.6 (03 Jun 2019 12:46), Max: 36.6 (02 Jun 2019 22:49)  T(F): 97.9 (03 Jun 2019 12:46), Max: 97.9 (02 Jun 2019 22:49)  HR: 85 (03 Jun 2019 12:46) (56 - 85)  BP: 112/70 (03 Jun 2019 12:46) (112/70 - 165/73)  BP(mean): 91 (03 Jun 2019 01:45) (91 - 91)  RR: 18 (03 Jun 2019 12:46) (18 - 18)  SpO2: 94% (03 Jun 2019 12:46) (94% - 98%)    Physical Exam:  General : well developed, well nourished and in  no acute distress  Neuro : Alert and oriented x 3, no focal deficits  HEENT : Sclera clear, no JVD,  neck supple  Lungs: Breathing nonlabored; clear to auscultation bilaterally.    Cardiovascular : +s 1 +S2, RRR   GI : abdomen soft, NT, ND, + BS   Extremities :+2 bilateral radial pulses.  No pedal edema.   Skin : Normal color and pigmentation w/ no skin lesions noted.         TELE: Sinus 60's-70's           with 2 episodes of mobitz type 1 overnight and this am while awake       LABS:                        14.9   6.70  )-----------( 217      ( 03 Jun 2019 09:05 )             45.1     06-03    140  |  101  |  21  ----------------------------<  104<H>  3.5   |  25  |  1.04    Ca    10.0      03 Jun 2019 07:07  Mg     2.3     06-03    TPro  8.5<H>  /  Alb  4.7  /  TBili  0.4  /  DBili  x   /  AST  24  /  ALT  17  /  AlkPhos  90  06-01          RADIOLOGY & ADDITIONAL STUDIES:  < from: Xray Chest 2 Views PA/Lat (01.19.16 @ 16:41) >  XAM:  RAD CHEST PA LAT        PROCEDURE DATE:  Jan 19 2016     INTERPRETATION:  TIME OF EXAM: January 19, 2016 at 4:40 PM.    CLINICAL INFORMATION: 70-year-old female with rectal bleeding. Chest pain.    TECHNIQUE:   PA and left lateral chest x-ray.    COMPARISON: None available.     INTERPRETATION: The heart is not enlarged.  The mediastinum and harry appear unremarkable. The thoracic aorta is   tortuous.  The lungs are clear.  No pleural effusion or pneumothorax is seen.  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    IMPRESSION: Clear lungs.    No pleural effusion or pneumothorax seen.     JODI DAVIDSON M.D., ATTENDING RADIOLOGIST  This document has been electronically signed. Jan 19 2016  4:47P 75 y/o F w/ PMHx of  recently diagnosed HTNand HLD Admitted w/ hypertensive urgency . Patient states she was recently diagnosed w/ HTN  5/2/19 and was started on BP medications losartan- HCT & Metoprolol . She states she began experiencing symptom of lightheadedness w/ metoprolol and therefore she decided to stop taking her metoprolol "3-4" days prior to admission.  She contacted her PMD a few days after discontinuing and he switched her from lopressor to another BP med. However,  she was unable to get medication filled as she began feeling unwell  after also missing "one dose" of her losartan- HCTz with symptom of neck discomfort radiating to bilateral shoulders, which she states she had been experiencing for a few weeks and given referral previously for a Cardiologist by her PMD. However now worsening and she became very anxious.  She checked her BP  and noted that it was very high (180).  She decided to take  BP medications, including a dose of her metoprolol ( 25mg) ; she rechecked her BP and it remained elevated ( SBP 200mmhg) so she came to the hospital for further evaluation. Noted w/ two episodes of mobitz type 1 on telemetry. 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   FAMILY HISTORY:  Family history of  MI in Mother &  Maternal Grandfather    MEDICATIONS  (STANDING):  aspirin  chewable 81 milliGRAM(s) Oral daily  atorvastatin 20 milliGRAM(s) Oral at bedtime  hydrochlorothiazide 25 milliGRAM(s) Oral daily  losartan 100 milliGRAM(s) Oral daily    MEDICATIONS  (PRN):      Allergies  No Known Allergies      REVIEW OF SYSTEM:    Constitutional: denies fevers/ fatigue   Neuro: denies headaches/ weakness,/dizziness  Resp: denies shortness of breath  CVS: denies chest pain/ palpitations/ leg swelling  GI: denies abdominal pain/ nausea/vomiting/ diarrhea/ hematochezia  : denies dysuria/ hematuria   Skin: denies rashes/ lesions   Endo: denies cold/heat intolerance  Psych: states gets anxious easily but does not desire medical therapy , has supportive [employer] group.   Heme: denies abnormal bleeds.      Vital Signs Last 24 Hrs  T(C): 36.6 (03 Jun 2019 12:46), Max: 36.6 (02 Jun 2019 22:49)  T(F): 97.9 (03 Jun 2019 12:46), Max: 97.9 (02 Jun 2019 22:49)  HR: 85 (03 Jun 2019 12:46) (56 - 85)  BP: 112/70 (03 Jun 2019 12:46) (112/70 - 165/73)  BP(mean): 91 (03 Jun 2019 01:45) (91 - 91)  RR: 18 (03 Jun 2019 12:46) (18 - 18)  SpO2: 94% (03 Jun 2019 12:46) (94% - 98%)    Physical Exam:  General : well developed, well nourished and in  no acute distress  Neuro : Alert and oriented x 3, no focal deficits  HEENT : Sclera clear, no JVD,  neck supple  Lungs: Breathing nonlabored; clear to auscultation bilaterally.    Cardiovascular : +s 1 +S2, RRR   GI : abdomen soft, NT, ND, + BS   Extremities :+2 bilateral radial pulses.  No pedal edema.   Skin : Normal color and pigmentation w/ no skin lesions noted.         TELE: Sinus 60's-70's           with 2 episodes of mobitz type 1 ; overnight and this am while awake       LABS:                        14.9   6.70  )-----------( 217      ( 03 Jun 2019 09:05 )             45.1     06-03    140  |  101  |  21  ----------------------------<  104<H>  3.5   |  25  |  1.04    Ca    10.0      03 Jun 2019 07:07  Mg     2.3     06-03    TPro  8.5<H>  /  Alb  4.7  /  TBili  0.4  /  DBili  x   /  AST  24  /  ALT  17  /  AlkPhos  90  06-01          RADIOLOGY & ADDITIONAL STUDIES:  < from: Xray Chest 2 Views PA/Lat (01.19.16 @ 16:41) >  XAM:  RAD CHEST PA LAT        PROCEDURE DATE:  Jan 19 2016     INTERPRETATION:  TIME OF EXAM: January 19, 2016 at 4:40 PM.    CLINICAL INFORMATION: 70-year-old female with rectal bleeding. Chest pain.    TECHNIQUE:   PA and left lateral chest x-ray.    COMPARISON: None available.     INTERPRETATION: The heart is not enlarged.  The mediastinum and harry appear unremarkable. The thoracic aorta is   tortuous.  The lungs are clear.  No pleural effusion or pneumothorax is seen.  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    IMPRESSION: Clear lungs.    No pleural effusion or pneumothorax seen.     JODI DAVIDSON M.D., ATTENDING RADIOLOGIST  This document has been electronically signed. Jan 19 2016  4:47P 75 y/o F w/ PMHx of  recently diagnosed HTNand HLD Admitted w/ hypertensive urgency . Patient states she was recently diagnosed w/ HTN  5/2/19 and was started on BP medications losartan- HCT & Metoprolol . She states she began experiencing symptom of lightheadedness w/ metoprolol and therefore she decided to stop taking her metoprolol "3-4" days prior to admission.  She contacted her PMD a few days after discontinuing and he switched her from lopressor to another BP med. However,  she was unable to get medication filled as she began feeling unwell  after also missing "one dose" of her losartan- HCTz with symptom of neck discomfort radiating to bilateral shoulders, which she states she had been experiencing for a few weeks and given referral previously for a Cardiologist by her PMD. However now worsening and she became very anxious.  She checked her BP  and noted that it was very high (180).  She decided to take  BP medications, including a dose of her metoprolol ( 25mg) ; she rechecked her BP and it remained elevated ( SBP 200mmhg) so she came to the hospital for further evaluation. Noted w/ two episodes of mobitz type 1 on telemetry. At time of 2nd episode patient w/ symptom of lightheadedness similar to lightheadedness experienced while taking metoprolol.     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   FAMILY HISTORY:  Family history of  MI in Mother &  Maternal Grandfather    MEDICATIONS  (STANDING):  aspirin  chewable 81 milliGRAM(s) Oral daily  atorvastatin 20 milliGRAM(s) Oral at bedtime  hydrochlorothiazide 25 milliGRAM(s) Oral daily  losartan 100 milliGRAM(s) Oral daily    MEDICATIONS  (PRN):      Allergies  No Known Allergies      REVIEW OF SYSTEM:    Constitutional: denies fevers/ fatigue   Neuro: denies headaches/ weakness,/dizziness  Resp: denies shortness of breath  CVS: denies chest pain/ palpitations/ leg swelling  GI: denies abdominal pain/ nausea/vomiting/ diarrhea/ hematochezia  : denies dysuria/ hematuria   Skin: denies rashes/ lesions   Endo: denies cold/heat intolerance  Psych: states gets anxious easily but does not desire medical therapy , has supportive [employer] group.   Heme: denies abnormal bleeds.      Vital Signs Last 24 Hrs  T(C): 36.6 (03 Jun 2019 12:46), Max: 36.6 (02 Jun 2019 22:49)  T(F): 97.9 (03 Jun 2019 12:46), Max: 97.9 (02 Jun 2019 22:49)  HR: 85 (03 Jun 2019 12:46) (56 - 85)  BP: 112/70 (03 Jun 2019 12:46) (112/70 - 165/73)  BP(mean): 91 (03 Jun 2019 01:45) (91 - 91)  RR: 18 (03 Jun 2019 12:46) (18 - 18)  SpO2: 94% (03 Jun 2019 12:46) (94% - 98%)    Physical Exam:  General : well developed, well nourished and in  no acute distress  Neuro : Alert and oriented x 3, no focal deficits  HEENT : Sclera clear, no JVD,  neck supple  Lungs: Breathing nonlabored; clear to auscultation bilaterally.    Cardiovascular : +s 1 +S2, RRR   GI : abdomen soft, NT, ND, + BS   Extremities :+2 bilateral radial pulses.  No pedal edema.   Skin : Normal color and pigmentation w/ no skin lesions noted.         TELE: Sinus 60's-70's           with 2 episodes of mobitz type 1 ; overnight and this am while awake       LABS:                        14.9   6.70  )-----------( 217      ( 03 Jun 2019 09:05 )             45.1     06-03    140  |  101  |  21  ----------------------------<  104<H>  3.5   |  25  |  1.04    Ca    10.0      03 Jun 2019 07:07  Mg     2.3     06-03    TPro  8.5<H>  /  Alb  4.7  /  TBili  0.4  /  DBili  x   /  AST  24  /  ALT  17  /  AlkPhos  90  06-01          RADIOLOGY & ADDITIONAL STUDIES:  < from: Xray Chest 2 Views PA/Lat (01.19.16 @ 16:41) >  XAM:  RAD CHEST PA LAT        PROCEDURE DATE:  Jan 19 2016     INTERPRETATION:  TIME OF EXAM: January 19, 2016 at 4:40 PM.    CLINICAL INFORMATION: 70-year-old female with rectal bleeding. Chest pain.    TECHNIQUE:   PA and left lateral chest x-ray.    COMPARISON: None available.     INTERPRETATION: The heart is not enlarged.  The mediastinum and harry appear unremarkable. The thoracic aorta is   tortuous.  The lungs are clear.  No pleural effusion or pneumothorax is seen.  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    IMPRESSION: Clear lungs.    No pleural effusion or pneumothorax seen.     JODI DAVIDSON M.D., ATTENDING RADIOLOGIST  This document has been electronically signed. Jan 19 2016  4:47P 75 y/o F w/ PMHx of  recently diagnosed HTNand HLD Admitted w/ hypertensive urgency . Patient states she was recently diagnosed w/ HTN  5/2/19 and was started on BP medications losartan- HCT & Metoprolol . She states she began experiencing symptom of lightheadedness w/ metoprolol and therefore she decided to stop taking her metoprolol "3-4" days prior to admission.  She contacted her PMD a few days after discontinuing and he switched her from metoprolol to another BP med. However,  she was unable to get medication filled as she began feeling unwell  after also missing "one dose" of her losartan- HCTz with symptom of neck discomfort radiating to bilateral shoulders, which she states she had been experiencing for a few weeks and given referral previously for a Cardiologist by her PMD. However now worsening and she became very anxious.  She checked her BP  and noted that it was very high (180).  She decided to take  BP medications, including a dose of her metoprolol ( 25mg) ; she rechecked her BP and it remained elevated ( SBP 200mmhg) so she came to the hospital for further evaluation. Noted w/ two episodes of mobitz type 1 on telemetry. At time of 2nd episode patient w/ symptom of lightheadedness similar to lightheadedness experienced while taking metoprolol.     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   FAMILY HISTORY:  Family history of  MI in Mother &  Maternal Grandfather    MEDICATIONS  (STANDING):  aspirin  chewable 81 milliGRAM(s) Oral daily  atorvastatin 20 milliGRAM(s) Oral at bedtime  hydrochlorothiazide 25 milliGRAM(s) Oral daily  losartan 100 milliGRAM(s) Oral daily    MEDICATIONS  (PRN):      Allergies  No Known Allergies      REVIEW OF SYSTEM:    Constitutional: denies fevers/ fatigue   Neuro: denies headaches/ weakness,/dizziness  Resp: denies shortness of breath  CVS: denies chest pain/ palpitations/ leg swelling  GI: denies abdominal pain/ nausea/vomiting/ diarrhea/ hematochezia  : denies dysuria/ hematuria   Skin: denies rashes/ lesions   Endo: denies cold/heat intolerance  Psych: states gets anxious easily but does not desire medical therapy , has supportive [employer] group.   Heme: denies abnormal bleeds.      Vital Signs Last 24 Hrs  T(C): 36.6 (03 Jun 2019 12:46), Max: 36.6 (02 Jun 2019 22:49)  T(F): 97.9 (03 Jun 2019 12:46), Max: 97.9 (02 Jun 2019 22:49)  HR: 85 (03 Jun 2019 12:46) (56 - 85)  BP: 112/70 (03 Jun 2019 12:46) (112/70 - 165/73)  BP(mean): 91 (03 Jun 2019 01:45) (91 - 91)  RR: 18 (03 Jun 2019 12:46) (18 - 18)  SpO2: 94% (03 Jun 2019 12:46) (94% - 98%)    Physical Exam:  General : well developed, well nourished and in  no acute distress  Neuro : Alert and oriented x 3, no focal deficits  HEENT : Sclera clear, no JVD,  neck supple  Lungs: Breathing nonlabored; clear to auscultation bilaterally.    Cardiovascular : +s 1 +S2, RRR   GI : abdomen soft, NT, ND, + BS   Extremities :+2 bilateral radial pulses.  No pedal edema.   Skin : Normal color and pigmentation w/ no skin lesions noted.         TELE: Sinus 60's-70's           with 2 episodes of mobitz type 1 ; overnight and this am while awake       LABS:                        14.9   6.70  )-----------( 217      ( 03 Jun 2019 09:05 )             45.1     06-03    140  |  101  |  21  ----------------------------<  104<H>  3.5   |  25  |  1.04    Ca    10.0      03 Jun 2019 07:07  Mg     2.3     06-03    TPro  8.5<H>  /  Alb  4.7  /  TBili  0.4  /  DBili  x   /  AST  24  /  ALT  17  /  AlkPhos  90  06-01          RADIOLOGY & ADDITIONAL STUDIES:  < from: Xray Chest 2 Views PA/Lat (01.19.16 @ 16:41) >  XAM:  RAD CHEST PA LAT        PROCEDURE DATE:  Jan 19 2016     INTERPRETATION:  TIME OF EXAM: January 19, 2016 at 4:40 PM.    CLINICAL INFORMATION: 70-year-old female with rectal bleeding. Chest pain.    TECHNIQUE:   PA and left lateral chest x-ray.    COMPARISON: None available.     INTERPRETATION: The heart is not enlarged.  The mediastinum and harry appear unremarkable. The thoracic aorta is   tortuous.  The lungs are clear.  No pleural effusion or pneumothorax is seen.  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    IMPRESSION: Clear lungs.    No pleural effusion or pneumothorax seen.     JODI DAVIDSON M.D., ATTENDING RADIOLOGIST  This document has been electronically signed. Jan 19 2016  4:47P

## 2019-06-03 NOTE — PROGRESS NOTE ADULT - PROBLEM SELECTOR PLAN 5
- chemical DVT ppx not needed given IMPROVE score 1, ambulatory  - DASH diet      Ekta Tolentino  PGY 2 DAY ADMIT  PAGER [phone] - chemical DVT ppx not needed given IMPROVE score 1, ambulatory  - DASH diet

## 2019-06-03 NOTE — CHART NOTE - NSCHARTNOTEFT_GEN_A_CORE
Notified by RN patient had 21 seconds of mobitiz type 1, HR in 30s (had a similar episode earlier in the day), asymptomatic, VSS, with return to NSR, EKG stable from prior, patient denies any CP/SOB/N/V/D/headache. Cardiology on board, patient pending CT-coronaries, instructed RN to place r2 pads at bedside. Will continue to monitor on tele, attending to follow in AM. Will inform day provider of overnight events.     Warren Escalante PA-C   Department of Medicine Notified by RN patient had 21 seconds of mobitiz type 1, HR in 30s (had a similar episode earlier in the day), asymptomatic, VSS, with return to NSR, EKG stable from prior, patient denies any CP/SOB/N/V/D/headache. Cardiology on board, patient pending CT-coronaries, instructed RN to place r2 pads at bedside. Will continue to monitor on tele, attending to follow in AM. Will inform day provider of overnight events.     - consider EP eval, if persistent or symptomatic bradycardia   - r2 pads at bedside   - EKG stable  - VSS  - C/W tele     Warren Escalante PA-C   Department of Medicine

## 2019-06-03 NOTE — PROGRESS NOTE ADULT - SUBJECTIVE AND OBJECTIVE BOX
PROGRESS NOTE  Reason for follow up: hypertension  Overnight: No new events.   Update: Patient lying in bed comfortably, no complaints.  at bedside. States that she gets tightness in her neck when walking up a flight of stairs, radiating down her shoulders and arms. Symptoms are relieved with rest and began one month ago. On telemetry, episodes of Mobitz type I.    Subjective: "I feel better"  Complains of: none  Review of symptoms: Cardiac:  No chest pain. No dyspnea. No palpitations.  Respiratory:no cough. No dyspnea  Gastrointestinal: No diarrhea. No abdominal pain. No bleeding.     Past medical history: No updates.   Chronic conditions:  Hypertension: controlled. HLD: Stable.   	  Vitals:  T(C): 36.6 (06-03-19 @ 12:46), Max: 37 (06-02-19 @ 19:17)  HR: 85 (06-03-19 @ 12:46) (56 - 85)  BP: 112/70 (06-03-19 @ 12:46) (112/70 - 165/73)  RR: 18 (06-03-19 @ 12:46) (18 - 18)  SpO2: 94% (06-03-19 @ 12:46) (93% - 98%)  Wt(kg): --  I&O's Summary    03 Jun 2019 07:01  -  03 Jun 2019 14:28  --------------------------------------------------------  IN: 160 mL / OUT: 0 mL / NET: 160 mL      Weight (kg): 57.6 (06-01 @ 22:01)    PHYSICAL EXAM:  Appearance: Comfortable. No acute distress  HEENT:  Head and neck: Atraumatic. Normocephalic.  Normal oral mucosa, PERRL, Neck is supple. No JVD, No carotid bruit.   Neurologic: A & O x 3, no focal deficits. EOMI   Lymphatic: No cervical lymphadenopathy  Cardiovascular: Normal S1 S2, No murmur, rubs/gallops. No JVD, No edema  Respiratory: Lungs clear to auscultation  Gastrointestinal:  Soft, Non-tender, + BS  Lower Extremities: No edema  Psychiatry: Patient is calm. No agitation. Mood & affect appropriate  Skin: No rashes/ ecchymoses/cyanosis/ulcers visualized on the face, hands or feet.    CURRENT MEDICATIONS:  hydrochlorothiazide 25 milliGRAM(s) Oral daily  losartan 100 milliGRAM(s) Oral daily    atorvastatin  aspirin  chewable      LABS:	 	                              14.9   6.70  )-----------( 217      ( 03 Jun 2019 09:05 )             45.1     06-03    140  |  101  |  21  ----------------------------<  104<H>  3.5   |  25  |  1.04    Ca    10.0      03 Jun 2019 07:07  Mg     2.3     06-03    TPro  8.5<H>  /  Alb  4.7  /  TBili  0.4  /  DBili  x   /  AST  24  /  ALT  17  /  AlkPhos  90  06-01    proBNP:   Lipid Profile:   HgA1c: TSH:     TELEMETRY: Reviewed  currently sinus rhythm, episodes of sinus bradycardia  ECG:  Reviewed by me. 	sinus bradycardia    DIAGNOSTIC TESTING:  [ ] Echocardiogram: pending  [ ] Stress Test:  pending  OTHER:

## 2019-06-04 DIAGNOSIS — N17.9 ACUTE KIDNEY FAILURE, UNSPECIFIED: ICD-10-CM

## 2019-06-04 LAB
ANION GAP SERPL CALC-SCNC: 16 MMOL/L — SIGNIFICANT CHANGE UP (ref 5–17)
BUN SERPL-MCNC: 31 MG/DL — HIGH (ref 7–23)
CALCIUM SERPL-MCNC: 9.9 MG/DL — SIGNIFICANT CHANGE UP (ref 8.4–10.5)
CHLORIDE SERPL-SCNC: 95 MMOL/L — LOW (ref 96–108)
CO2 SERPL-SCNC: 25 MMOL/L — SIGNIFICANT CHANGE UP (ref 22–31)
CREAT SERPL-MCNC: 1.2 MG/DL — SIGNIFICANT CHANGE UP (ref 0.5–1.3)
GLUCOSE SERPL-MCNC: 115 MG/DL — HIGH (ref 70–99)
HCT VFR BLD CALC: 48.4 % — HIGH (ref 34.5–45)
HGB BLD-MCNC: 15.5 G/DL — SIGNIFICANT CHANGE UP (ref 11.5–15.5)
MCHC RBC-ENTMCNC: 28.8 PG — SIGNIFICANT CHANGE UP (ref 27–34)
MCHC RBC-ENTMCNC: 32 GM/DL — SIGNIFICANT CHANGE UP (ref 32–36)
MCV RBC AUTO: 90 FL — SIGNIFICANT CHANGE UP (ref 80–100)
PLATELET # BLD AUTO: 223 K/UL — SIGNIFICANT CHANGE UP (ref 150–400)
POTASSIUM SERPL-MCNC: 3.2 MMOL/L — LOW (ref 3.5–5.3)
POTASSIUM SERPL-SCNC: 3.2 MMOL/L — LOW (ref 3.5–5.3)
RBC # BLD: 5.38 M/UL — HIGH (ref 3.8–5.2)
RBC # FLD: 13.2 % — SIGNIFICANT CHANGE UP (ref 10.3–14.5)
SODIUM SERPL-SCNC: 136 MMOL/L — SIGNIFICANT CHANGE UP (ref 135–145)
WBC # BLD: 6.36 K/UL — SIGNIFICANT CHANGE UP (ref 3.8–10.5)
WBC # FLD AUTO: 6.36 K/UL — SIGNIFICANT CHANGE UP (ref 3.8–10.5)

## 2019-06-04 PROCEDURE — 93010 ELECTROCARDIOGRAM REPORT: CPT

## 2019-06-04 PROCEDURE — 99233 SBSQ HOSP IP/OBS HIGH 50: CPT

## 2019-06-04 RX ORDER — SODIUM CHLORIDE 9 MG/ML
1000 INJECTION INTRAMUSCULAR; INTRAVENOUS; SUBCUTANEOUS
Refills: 0 | Status: DISCONTINUED | OUTPATIENT
Start: 2019-06-04 | End: 2019-06-05

## 2019-06-04 RX ORDER — POTASSIUM CHLORIDE 20 MEQ
20 PACKET (EA) ORAL
Refills: 0 | Status: COMPLETED | OUTPATIENT
Start: 2019-06-04 | End: 2019-06-04

## 2019-06-04 RX ADMIN — SODIUM CHLORIDE 75 MILLILITER(S): 9 INJECTION INTRAMUSCULAR; INTRAVENOUS; SUBCUTANEOUS at 12:25

## 2019-06-04 RX ADMIN — Medication 20 MILLIEQUIVALENT(S): at 12:25

## 2019-06-04 RX ADMIN — Medication 25 MILLIGRAM(S): at 06:04

## 2019-06-04 RX ADMIN — Medication 81 MILLIGRAM(S): at 12:25

## 2019-06-04 RX ADMIN — Medication 20 MILLIEQUIVALENT(S): at 14:14

## 2019-06-04 RX ADMIN — ATORVASTATIN CALCIUM 20 MILLIGRAM(S): 80 TABLET, FILM COATED ORAL at 21:08

## 2019-06-04 RX ADMIN — LOSARTAN POTASSIUM 100 MILLIGRAM(S): 100 TABLET, FILM COATED ORAL at 06:04

## 2019-06-04 RX ADMIN — Medication 20 MILLIEQUIVALENT(S): at 16:47

## 2019-06-04 NOTE — PROGRESS NOTE ADULT - ASSESSMENT
75 y/o F w/ PMHx of  recently diagnosed HTN( 5/2/19) and HLD. Admitted w/ Hypertensive urgency, after discontinuing her Metoprolol for 3-4 days and missing a dose of her Losartan-HCTz,  and neck pain radiating to bilateral shoulders). ( note that  patient admits to taking x1 dose of her Metoprolol and her losartan-HCTz pill on day of presentation to ED, 6/1, in an attempt to decrease her BP after noting it to be very high).  Noted w/ two episodes of mobitz type 1 on telemetry. At time of 2nd episode patient w/ symptom of lightheadedness similar to lightheadedness experienced while taking metoprolol.       # Symptomatic second degree AV block type 1   - Still had brief Wenckebach overnight  - Continue to hold off on all AVN blockers  - Diuretic/HCTZ on hold  - Continue telemetry monitoring  - TTE from yesterday with EF 80-85%  - Awaiting NST r/o ischemia    #[id number]

## 2019-06-04 NOTE — PROGRESS NOTE ADULT - SUBJECTIVE AND OBJECTIVE BOX
PROGRESS NOTE  Reason for follow up: hypertension  Overnight: No new events.   Update: Patient lying in bed comfortably, no complaints.  at bedside.  On telemetry, episodes of Mobitz type I.    Subjective: "I feel ok"  Complains of: none  Review of symptoms: Cardiac:  No chest pain. No dyspnea. No palpitations.  Respiratory:no cough. No dyspnea  Gastrointestinal: No diarrhea. No abdominal pain. No bleeding.     Past medical history: No updates.   Chronic conditions:  Hypertension: controlled. HLD: Stable.   	  Vitals:  Vital Signs Last 24 Hrs  T(C): 36.9 (04 Jun 2019 14:05), Max: 36.9 (04 Jun 2019 14:05)  T(F): 98.4 (04 Jun 2019 14:05), Max: 98.4 (04 Jun 2019 14:05)  HR: 79 (04 Jun 2019 14:05) (52 - 79)  BP: 139/76 (04 Jun 2019 14:05) (116/63 - 145/72)  BP(mean): --  RR: 18 (04 Jun 2019 14:05) (16 - 18)  SpO2: 95% (04 Jun 2019 14:05) (95% - 98%)    Weight (kg): 57.6 (06-01 @ 22:01)    PHYSICAL EXAM:  Appearance: Comfortable. No acute distress  HEENT:  Head and neck: Atraumatic. Normocephalic.  Normal oral mucosa, PERRL, Neck is supple. No JVD, No carotid bruit.   Neurologic: A & O x 3, no focal deficits. EOMI   Lymphatic: No cervical lymphadenopathy  Cardiovascular: Normal S1 S2, No murmur, rubs/gallops. No JVD, No edema  Respiratory: Lungs clear to auscultation  Gastrointestinal:  Soft, Non-tender, + BS  Lower Extremities: No edema  Psychiatry: Patient is calm. No agitation. Mood & affect appropriate  Skin: No rashes/ ecchymoses/cyanosis/ulcers visualized on the face, hands or feet.    CURRENT MEDICATIONS:  MEDICATIONS  (STANDING):  aspirin  chewable 81 milliGRAM(s) Oral daily  atorvastatin 20 milliGRAM(s) Oral at bedtime  losartan 100 milliGRAM(s) Oral daily  potassium chloride    Tablet ER 20 milliEquivalent(s) Oral every 2 hours  sodium chloride 0.9%. 1000 milliLiter(s) (75 mL/Hr) IV Continuous <Continuous>    MEDICATIONS  (PRN):      LABS:	 	                                   15.5   6.36  )-----------( 223      ( 04 Jun 2019 09:48 )             48.4   N=x    ; L=x        04 Jun 2019 07:15    136    |  95     |  31     ----------------------------<  115    3.2     |  25     |  1.20     Ca    9.9        04 Jun 2019 07:15  Mg     2.3       03 Jun 2019 07:07      proBNP:   Lipid Profile:   HgA1c: TSH:     TELEMETRY: Reviewed  currently sinus rhythm, episodes of sinus bradycardia  ECG:  Reviewed by me. 	sinus bradycardia    DIAGNOSTIC TESTING:  [ ] Echocardiogram:   < from: Transthoracic Echocardiogram (06.03.19 @ 17:18) >  Conclusions:  1. Normal mitral valve. Minimal mitral regurgitation.  2. Aortic valve not wellvisualized; probably normal. No  aortic valve regurgitation seen.  3. Hyperdynamic left ventricular systolic function.  4. Normal right ventricular size and function.  5. Thickened pericardium. Trace pericardial effusion seen.  *** No previous Echo exam.    < end of copied text >    [ ] Stress Test:  pending  OTHER:

## 2019-06-04 NOTE — PROGRESS NOTE ADULT - SUBJECTIVE AND OBJECTIVE BOX
Patient is a 74y old  Female who presents with a chief complaint of hypertension (03 Jun 2019 14:27)      SUBJECTIVE / OVERNIGHT EVENTS:    patient seen and examined at bedside. feels ok. some dizziness last night, no chest pain, palpitations.    tele type 1 mobitz 40s- 60s    ROS:  14 point ROS negative in detail except stated as above    MEDICATIONS  (STANDING):  aspirin  chewable 81 milliGRAM(s) Oral daily  atorvastatin 20 milliGRAM(s) Oral at bedtime  losartan 100 milliGRAM(s) Oral daily    MEDICATIONS  (PRN):      CAPILLARY BLOOD GLUCOSE        I&O's Summary    03 Jun 2019 07:01  -  04 Jun 2019 07:00  --------------------------------------------------------  IN: 340 mL / OUT: 500 mL / NET: -160 mL        PHYSICAL EXAM:  Vital Signs Last 24 Hrs  T(C): 36.7 (04 Jun 2019 05:31), Max: 36.7 (04 Jun 2019 05:31)  T(F): 98.1 (04 Jun 2019 05:31), Max: 98.1 (04 Jun 2019 05:31)  HR: 52 (04 Jun 2019 05:31) (52 - 85)  BP: 116/63 (04 Jun 2019 05:31) (112/70 - 145/72)  BP(mean): --  RR: 16 (04 Jun 2019 05:31) (16 - 18)  SpO2: 98% (04 Jun 2019 05:31) (94% - 98%)    GENERAL: NAD, well-developed  HEAD:  Atraumatic, Normocephalic  EYES: EOMI, PERRL, conjunctiva and sclera clear  NECK: Supple, No JVD  CHEST/LUNG: Clear to auscultation bilaterally; No wheeze  HEART: Regular rate and rhythm; No murmurs, rubs, or gallops  ABDOMEN: Soft, Nontender, Nondistended; Bowel sounds present  EXTREMITIES:  2+ Peripheral Pulses, No clubbing, cyanosis, or edema  NEUROLOGY: AAOx3; non-focal  SKIN: No rashes or lesions    LABS:                        15.5   6.36  )-----------( 223      ( 04 Jun 2019 09:48 )             48.4     06-04    136  |  95<L>  |  31<H>  ----------------------------<  115<H>  3.2<L>   |  25  |  1.20    Ca    9.9      04 Jun 2019 07:15  Mg     2.3     06-03                RADIOLOGY & ADDITIONAL TESTS:    Imaging Personally Reviewed:  < from: Transthoracic Echocardiogram (06.03.19 @ 17:18) >  Conclusions:  1. Normal mitral valve. Minimal mitral regurgitation.  2. Aortic valve not wellvisualized; probably normal. No  aortic valve regurgitation seen.  3. Hyperdynamic left ventricular systolic function.  4. Normal right ventricular size and function.  5. Thickened pericardium. Trace pericardial effusion seen.  *** No previous Echo exam.    < end of copied text >    Consultant(s) Notes Reviewed:    ep  Care Discussed with Consultants/Other Providers:  np ally

## 2019-06-04 NOTE — PROGRESS NOTE ADULT - SUBJECTIVE AND OBJECTIVE BOX
24H hour events:   resting comfortably in bed;     MEDICATIONS:  aspirin  chewable 81 milliGRAM(s) Oral daily  losartan 100 milliGRAM(s) Oral daily  atorvastatin 20 milliGRAM(s) Oral at bedtime  potassium chloride    Tablet ER 20 milliEquivalent(s) Oral every 2 hours  sodium chloride 0.9%. 1000 milliLiter(s) IV Continuous <Continuous>      REVIEW OF SYSTEMS:  Constitutional: in NAD  Neuro: had brief dizziness this morning when walking up to bathroom  Respiratory: denies sob or cough  Cardiac: denies chest pain or palpitations  GI: denies nausea or vomiting  : denies dysuria  Ext: denies weakness or numbness    PHYSICAL EXAM:  T(C): 36.7 (06-04-19 @ 05:31), Max: 36.7 (06-04-19 @ 05:31)  HR: 52 (06-04-19 @ 05:31) (52 - 74)  BP: 116/63 (06-04-19 @ 05:31) (116/63 - 145/72)  RR: 16 (06-04-19 @ 05:31) (16 - 18)  SpO2: 98% (06-04-19 @ 05:31) (95% - 98%)  Wt(kg): --  I&O's Summary    03 Jun 2019 07:01  -  04 Jun 2019 07:00  --------------------------------------------------------  IN: 340 mL / OUT: 500 mL / NET: -160 mL    04 Jun 2019 07:01  -  04 Jun 2019 12:51  --------------------------------------------------------  IN: 240 mL / OUT: 0 mL / NET: 240 mL        Appearance: Normal, NAD  HEENT: Normocephalic, atraumatic, neck supple, normal oral mucosa  Cardiovascular: Normal S1 S2, No JVD, No murmurs, No edema  Respiratory: Lungs clear to auscultation	  Psychiatry: A & O x 3, Mood & affect appropriate  Gastrointestinal:  Soft, Non-tender, + BS	  Skin: No rashes, No ecchymoses, No cyanosis	  Neurologic: Non-focal  Extremities: Normal range of motion, No clubbing, cyanosis or edema  Vascular: Peripheral pulses palpable 2+ bilaterally        LABS:	 	    CBC Full  -  ( 04 Jun 2019 09:48 )  WBC Count : 6.36 K/uL  Hemoglobin : 15.5 g/dL  Hematocrit : 48.4 %  Platelet Count - Automated : 223 K/uL  Mean Cell Volume : 90.0 fl  Mean Cell Hemoglobin : 28.8 pg  Mean Cell Hemoglobin Concentration : 32.0 gm/dL  Auto Neutrophil # : x  Auto Lymphocyte # : x  Auto Monocyte # : x  Auto Eosinophil # : x  Auto Basophil # : x  Auto Neutrophil % : x  Auto Lymphocyte % : x  Auto Monocyte % : x  Auto Eosinophil % : x  Auto Basophil % : x    06-04    136  |  95<L>  |  31<H>  ----------------------------<  115<H>  3.2<L>   |  25  |  1.20  06-03    140  |  101  |  21  ----------------------------<  104<H>  3.5   |  25  |  1.04    Ca    9.9      04 Jun 2019 07:15  Ca    10.0      03 Jun 2019 07:07  Mg     2.3     06-03      TELEMETRY: MOR 60-90's, brief Wenckebach last night 50's  	    Study Date: 6/3/2019    PROCEDURE: Transthoracic echocardiogram with 2-D, M-Mode  and complete spectral and colorflow Doppler.  INDICATION: Hypertensive heart disease without heart  failure (I11.9)  ------------------------------------------------------------------------  Dimensions:    Normal Values:  LA:     3.0    2.0 - 4.0 cm  Ao:     2.7    2.0 - 3.8 cm  SEPTUM: 1.0    0.6 - 1.2 cm  PWT:    1.1    0.6 - 1.1 cm  LVIDd:  3.1    3.0 - 5.6 cm  LVIDs:  1.8    1.8 - 4.0 cm  Derived variables:  LVMI: 65 g/m2  RWT: 0.72  Fractional short: 43 %  EF (Visual Estimate): 80-85 %  ------------------------------------------------------------------------  Observations:  Mitral Valve: Normal mitral valve. Minimal mitral  regurgitation.  Aortic Valve/Aorta: Aortic valve not well visualized;  probably normal. No aortic valve regurgitation seen. Peak  left ventricularoutflow tract gradient equals 7 mm Hg.  No  significant LVOT obstruction.  Aortic Root: 2.7 cm.  Left Atrium: LA volume index = 14 cc/m2.  Left Ventricle: Hyperdynamic left ventricular systolic  function. Normal left ventricular internal dimensions and  wall thickness. Indeterminate diastolic function.  Right Heart: Normal right atrium. Normal right ventricular  size and function. Normal tricuspid valve. Mild tricuspid  regurgitation. Pulmonic valve not well visualized, probably  normal. No pulmonic regurgitation.  Pericardium/Pleura: Thickened pericardium. Trace  pericardial effusion seen.  Hemodynamic: Estimated right ventricular systolic pressure  equals 21 mm Hg, assuming right atrial pressure equals 3 mm  Hg, consistent with normal pulmonary pressures. Color  Doppler demonstrates no evidence of a patent foramen ovale.  ------------------------------------------------------------------------  Conclusions:  1. Normal mitral valve. Minimal mitral regurgitation.  2. Aortic valve not well visualized; probably normal. No  aortic valve regurgitation seen.  3. Hyperdynamic left ventricular systolic function.  4. Normal right ventricular size and function.  5. Thickened pericardium. Trace pericardial effusion seen.  *** No previous Echo exam.  ------------------------------------------------------------------------  Confirmed on  6/3/2019 - 22:03:37 by Hadyen Ojeda M.D.  ----------------------------------------------

## 2019-06-05 LAB
ANION GAP SERPL CALC-SCNC: 12 MMOL/L — SIGNIFICANT CHANGE UP (ref 5–17)
BUN SERPL-MCNC: 22 MG/DL — SIGNIFICANT CHANGE UP (ref 7–23)
CALCIUM SERPL-MCNC: 9.5 MG/DL — SIGNIFICANT CHANGE UP (ref 8.4–10.5)
CHLORIDE SERPL-SCNC: 105 MMOL/L — SIGNIFICANT CHANGE UP (ref 96–108)
CK SERPL-CCNC: 79 U/L — SIGNIFICANT CHANGE UP (ref 25–170)
CO2 SERPL-SCNC: 25 MMOL/L — SIGNIFICANT CHANGE UP (ref 22–31)
CREAT SERPL-MCNC: 0.92 MG/DL — SIGNIFICANT CHANGE UP (ref 0.5–1.3)
GLUCOSE SERPL-MCNC: 108 MG/DL — HIGH (ref 70–99)
HCT VFR BLD CALC: 42.1 % — SIGNIFICANT CHANGE UP (ref 34.5–45)
HGB BLD-MCNC: 13.8 G/DL — SIGNIFICANT CHANGE UP (ref 11.5–15.5)
MCHC RBC-ENTMCNC: 29.9 PG — SIGNIFICANT CHANGE UP (ref 27–34)
MCHC RBC-ENTMCNC: 32.8 GM/DL — SIGNIFICANT CHANGE UP (ref 32–36)
MCV RBC AUTO: 91.3 FL — SIGNIFICANT CHANGE UP (ref 80–100)
PLATELET # BLD AUTO: 203 K/UL — SIGNIFICANT CHANGE UP (ref 150–400)
POTASSIUM SERPL-MCNC: 4.9 MMOL/L — SIGNIFICANT CHANGE UP (ref 3.5–5.3)
POTASSIUM SERPL-SCNC: 4.9 MMOL/L — SIGNIFICANT CHANGE UP (ref 3.5–5.3)
RBC # BLD: 4.61 M/UL — SIGNIFICANT CHANGE UP (ref 3.8–5.2)
RBC # FLD: 13.1 % — SIGNIFICANT CHANGE UP (ref 10.3–14.5)
SODIUM SERPL-SCNC: 142 MMOL/L — SIGNIFICANT CHANGE UP (ref 135–145)
TROPONIN T, HIGH SENSITIVITY RESULT: 10 NG/L — SIGNIFICANT CHANGE UP (ref 0–51)
WBC # BLD: 5.99 K/UL — SIGNIFICANT CHANGE UP (ref 3.8–10.5)
WBC # FLD AUTO: 5.99 K/UL — SIGNIFICANT CHANGE UP (ref 3.8–10.5)

## 2019-06-05 PROCEDURE — 93018 CV STRESS TEST I&R ONLY: CPT

## 2019-06-05 PROCEDURE — 99233 SBSQ HOSP IP/OBS HIGH 50: CPT

## 2019-06-05 PROCEDURE — 78452 HT MUSCLE IMAGE SPECT MULT: CPT | Mod: 26

## 2019-06-05 PROCEDURE — 93010 ELECTROCARDIOGRAM REPORT: CPT | Mod: 76

## 2019-06-05 PROCEDURE — 93016 CV STRESS TEST SUPVJ ONLY: CPT

## 2019-06-05 RX ORDER — ACETAMINOPHEN 500 MG
650 TABLET ORAL EVERY 6 HOURS
Refills: 0 | Status: DISCONTINUED | OUTPATIENT
Start: 2019-06-05 | End: 2019-06-08

## 2019-06-05 RX ORDER — FAMOTIDINE 10 MG/ML
20 INJECTION INTRAVENOUS ONCE
Refills: 0 | Status: COMPLETED | OUTPATIENT
Start: 2019-06-05 | End: 2019-06-05

## 2019-06-05 RX ORDER — PANTOPRAZOLE SODIUM 20 MG/1
40 TABLET, DELAYED RELEASE ORAL DAILY
Refills: 0 | Status: DISCONTINUED | OUTPATIENT
Start: 2019-06-05 | End: 2019-06-08

## 2019-06-05 RX ADMIN — LOSARTAN POTASSIUM 100 MILLIGRAM(S): 100 TABLET, FILM COATED ORAL at 05:47

## 2019-06-05 RX ADMIN — Medication 81 MILLIGRAM(S): at 09:19

## 2019-06-05 RX ADMIN — PANTOPRAZOLE SODIUM 40 MILLIGRAM(S): 20 TABLET, DELAYED RELEASE ORAL at 09:19

## 2019-06-05 RX ADMIN — FAMOTIDINE 20 MILLIGRAM(S): 10 INJECTION INTRAVENOUS at 09:17

## 2019-06-05 RX ADMIN — ATORVASTATIN CALCIUM 20 MILLIGRAM(S): 80 TABLET, FILM COATED ORAL at 20:01

## 2019-06-05 NOTE — PROVIDER CONTACT NOTE (OTHER) - ASSESSMENT
Pt A/Ox4. asymptomatic. /68 HR 67 Temp 97.5 oral O2sat 97% on room air. Denies any discomfort. no chest pain

## 2019-06-05 NOTE — PROVIDER CONTACT NOTE (OTHER) - SITUATION
Pt has hystory of HB. On tele had brief HB type 2. VSS as charted, pt asymptomatic. R2 pads at bedside.

## 2019-06-05 NOTE — PROGRESS NOTE ADULT - ASSESSMENT
73 y/o F with a PMH significant for recently diagnosed HTN and HLD who presents to the hospital with high blood pressure, admitted for hypertensive urgency and atypical cp

## 2019-06-05 NOTE — PROGRESS NOTE ADULT - SUBJECTIVE AND OBJECTIVE BOX
24H hour events:   Seen sitting up in bed, comfortable; c/o of brief left sided neck tightening overcoming her with brief palpitations, lasting seconds, spontaneously terminated    MEDICATIONS:  aspirin  chewable 81 milliGRAM(s) Oral daily  losartan 100 milliGRAM(s) Oral daily  acetaminophen   Tablet .. 650 milliGRAM(s) Oral every 6 hours PRN  pantoprazole  Injectable 40 milliGRAM(s) IV Push daily  atorvastatin 20 milliGRAM(s) Oral at bedtime      REVIEW OF SYSTEMS:  Constitutional: no fever or chills  Neuro: denies dizziness, headache, vision change  Respiratory: denies cough or sob  Cardiac: denies chest pain, had brief palpitations around 9am without event on telemetry  GI: denies nausea or vomiting  : denies dysuria or frequency      PHYSICAL EXAM:  T(C): 36.6 (06-05-19 @ 05:26), Max: 36.9 (06-04-19 @ 14:05)  HR: 76 (06-05-19 @ 08:31) (57 - 79)  BP: 164/73 (06-05-19 @ 08:31) (113/68 - 164/73)  RR: 18 (06-05-19 @ 05:26) (18 - 18)  SpO2: 98% (06-05-19 @ 05:26) (95% - 98%)  Wt(kg): --  I&O's Summary    04 Jun 2019 07:01  -  05 Jun 2019 07:00  --------------------------------------------------------  IN: 1305 mL / OUT: 300 mL / NET: 1005 mL    05 Jun 2019 07:01  -  05 Jun 2019 11:15  --------------------------------------------------------  IN: 240 mL / OUT: 0 mL / NET: 240 mL        Appearance: Normal, in NAD  HEENT: Normocephalic, atraumatic, neck supple, normal oral mucosa	  Cardiovascular: Normal S1 S2, No JVD, No murmurs, No edema  Respiratory: Lungs clear to auscultation	  Psychiatry: A & O x 3, Mood & affect appropriate  Gastrointestinal:  Soft, Non-tender, + BS	  Skin: No rashes, No ecchymoses, No cyanosis	  Neurologic: Non-focal  Extremities: Normal range of motion, No clubbing, cyanosis or edema  Vascular: Peripheral pulses palpable 2+ bilaterally        LABS:	 	    CBC Full  -  ( 05 Jun 2019 08:45 )  WBC Count : 5.99 K/uL  Hemoglobin : 13.8 g/dL  Hematocrit : 42.1 %  Platelet Count - Automated : 203 K/uL  Mean Cell Volume : 91.3 fl  Mean Cell Hemoglobin : 29.9 pg  Mean Cell Hemoglobin Concentration : 32.8 gm/dL  Auto Neutrophil # : x  Auto Lymphocyte # : x  Auto Monocyte # : x  Auto Eosinophil # : x  Auto Basophil # : x  Auto Neutrophil % : x  Auto Lymphocyte % : x  Auto Monocyte % : x  Auto Eosinophil % : x  Auto Basophil % : x    06-05    142  |  105  |  22  ----------------------------<  108<H>  4.9   |  25  |  0.92  06-04    136  |  95<L>  |  31<H>  ----------------------------<  115<H>  3.2<L>   |  25  |  1.20    Ca    9.5      05 Jun 2019 06:34  Ca    9.9      04 Jun 2019 07:15      Creatine Kinase, Serum: 79 U/L (06-05-19 @ 09:28)      TELEMETRY: Sinus Bradycardia/NSR 50-60's, intermittent Wenckebach at night time appears Nocturnal  	    Study Date: 6/3/2019  PROCEDURE: Transthoracic echocardiogram with 2-D, M-Mode  and complete spectral and colorflow Doppler.  INDICATION: Hypertensive heart disease without heart  failure (I11.9)  ------------------------------------------------------------------------  Dimensions:    Normal Values:  LA:     3.0    2.0 - 4.0 cm  Ao:     2.7    2.0 - 3.8 cm  SEPTUM: 1.0    0.6 - 1.2 cm  PWT:    1.1    0.6 - 1.1 cm  LVIDd:  3.1    3.0 - 5.6 cm  LVIDs:  1.8    1.8 - 4.0 cm  Derived variables:  LVMI: 65 g/m2  RWT: 0.72  Fractional short: 43 %  EF (Visual Estimate): 80-85 %  ------------------------------------------------------------------------  Observations:  Mitral Valve: Normal mitral valve. Minimal mitral  regurgitation.  Aortic Valve/Aorta: Aortic valve not well visualized;  probably normal. No aortic valve regurgitation seen. Peak  left ventricularoutflow tract gradient equals 7 mm Hg.  No  significant LVOT obstruction.  Aortic Root: 2.7 cm.  Left Atrium: LA volume index = 14 cc/m2.  Left Ventricle: Hyperdynamic left ventricular systolic  function. Normal left ventricular internal dimensions and  wall thickness. Indeterminate diastolic function.  Right Heart: Normal right atrium. Normal right ventricular  size and function. Normal tricuspid valve. Mild tricuspid  regurgitation. Pulmonic valve not well visualized, probably  normal. No pulmonic regurgitation.  Pericardium/Pleura: Thickened pericardium. Trace  pericardial effusion seen.  Hemodynamic: Estimated right ventricular systolic pressure  equals 21 mm Hg, assuming right atrial pressure equals 3 mm  Hg, consistent with normal pulmonary pressures. Color  Doppler demonstrates no evidence of a patent foramen ovale.  ------------------------------------------------------------------------  Conclusions:  1. Normal mitral valve. Minimal mitral regurgitation.  2. Aortic valve not wellvisualized; probably normal. No  aortic valve regurgitation seen.  3. Hyperdynamic left ventricular systolic function.  4. Normal right ventricular size and function.  5. Thickened pericardium. Trace pericardial effusion seen.  *** No previous Echo exam.  ------------------------------------------------------------------------  Confirmed on  6/3/2019 - 22:03:37 by Hayden Ojeda M.D.  ------------------------------------------------------------------------

## 2019-06-05 NOTE — CHART NOTE - NSCHARTNOTEFT_GEN_A_CORE
CC: Heart block, 2nd degree type 1    Event Summary:  Notified by RN that patient HB Mobitz type 1 x 15 seconds on telemetry. CC: Heart block, 2nd degree type 1    Event Summary:  Notified by RN that patient second degree HB type 1 x 15 seconds on telemetry. Patient with previous hx of CC: Heart block, 2nd degree type 1    Event Summary:  Notified by RN that patient second degree HB type 1 x 15 seconds on telemetry. Patient with previous similar episodes on telemetry.  Patient seen and examined at bedside, in NAD. Patient asymptomatic at time of event, no acute complaints. Denies CP, palpitations, dizziness, weakness, syncope.    Vital Signs Last 24 Hrs  T(C): 36.4 (05 Jun 2019 19:59), Max: 36.7 (05 Jun 2019 16:14)  T(F): 97.5 (05 Jun 2019 19:59), Max: 98 (05 Jun 2019 16:14)  HR: 67 (05 Jun 2019 19:59) (57 - 76)  BP: 136/68 (05 Jun 2019 19:59) (122/67 - 164/77)  BP(mean): --  RR: 18 (05 Jun 2019 19:59) (18 - 18)  SpO2: 97% (05 Jun 2019 19:59) (97% - 98%)    Physical Exam  General: A&Ox3, well-developed female lying in bed, in NAD  Card: S1/S2, RRR  Pulm: CTA b/l, respirations even and non-labored  Abd: soft, nontender, nondistended +BS  Ext: no BLE edema, distal pulse 2+      A/P:  75 y/o F with a PMH significant for recently diagnosed HTN and HLD who presents to the hospital with high blood pressure, admitted for hypertensive urgency and atypical cp    # 2nd degree AV Heart Block, type 1- asymptomatic  - VS hemodynamically stable  - Stat EKG shows sinus rhythm, 1st degree block, HR 69.  - Pt currently off of AVN blockers.  - Plan for cath in AM.  - EP following, finish ischemic work-up prior to any intervention.  - Continue to monitor on telemetry.  Will endorse to primary team in AM.      Perla Vaughan PA-C  Department of Medicine  #[id number]

## 2019-06-05 NOTE — PROGRESS NOTE ADULT - SUBJECTIVE AND OBJECTIVE BOX
Progress note to be completed tonight.     Reviewed Stress Test. Planned fro Cath tomorrow. Discussed with patient and boarded.     Florence Henriquez MD, MPH, ITZ, RPVI, FACC  Cardiovascular Specialist   [facility]  c: [phone]  e: [email or web address] SUBJ:  No acute events. Pharm nuclear stress test positive for anterior wall ischemia suggestive of significant LAD disease. ECHO with preserved EF and no WMA. Planned for cardiac cath tomorrow. Denies chest pain and shortness of breath.     MEDICATIONS  (STANDING):  aspirin  chewable 81 milliGRAM(s) Oral daily  atorvastatin 20 milliGRAM(s) Oral at bedtime  losartan 100 milliGRAM(s) Oral daily  pantoprazole  Injectable 40 milliGRAM(s) IV Push daily    MEDICATIONS  (PRN):  acetaminophen   Tablet .. 650 milliGRAM(s) Oral every 6 hours PRN Mild Pain (1 - 3)    Vital Signs Last 24 Hrs  T(C): 36.6 (06 Jun 2019 00:34), Max: 36.7 (05 Jun 2019 16:14)  T(F): 97.9 (06 Jun 2019 00:34), Max: 98 (05 Jun 2019 16:14)  HR: 71 (06 Jun 2019 02:13) (63 - 76)  BP: 138/77 (06 Jun 2019 02:13) (122/67 - 164/77)  RR: 18 (06 Jun 2019 00:34) (18 - 18)  SpO2: 96% (06 Jun 2019 00:34) (96% - 98%)    REVIEW OF SYSTEMS:  As per HPI, otherwise unremarkable.     PHYSICAL EXAM:  Appearance: Normal	  HEENT:   Normal oral mucosa  Lymphatic: No lymphadenopathy  Cardiovascular: Normal S1 S2, No edema  Respiratory: Lungs clear to auscultation	  Psychiatry: A & O x 3  Gastrointestinal:  Soft, Non-tender  Skin: No rashes, No ecchymoses	  Neurologic: Non-focal  Extremities: No clubbing, cyanosis or edema  Vascular: Peripheral pulses palpable 2+ bilaterally    TTE: 6/3/2019  Conclusions:  1. Normal mitral valve. Minimal mitral regurgitation.  2. Aortic valve not well visualized; probably normal. No  aortic valve regurgitation seen.  3. Hyperdynamic left ventricular systolic function.  4. Normal right ventricular size and function.  5. Thickened pericardium. Trace pericardial effusion seen.  *** No previous Echo exam.    MPI Pharm 6/5/2019  IMPRESSIONS: Abnormal Study  * Chest Pain: Patient developed neck tightness at 2:00 min  following regadenoson infusion at a HR of 104 bpm and  chest heaviness at 4:00 min following infusion at 98 bpm  which resolved by 7:40 minutes of recovery following  administration of aminophylline.  * Symptom: Neck and chest discomfort during infusion  resolved after administration of Aminophylline during  prolonged recovery.  * HR Response: Appropriate.  * BP Response: Appropriate.  * Heart Rhythm: Sinus Rhythm - 65 BPM.  * Conduction defects: Early repolarization.  * Q Waves: III and aVF.  * Baseline ECG: Nonspecific ST-T wave abnormality.  * ECG Changes: ST Depression: 1 mm downsloping in leads  II, III, aVF, and V3-V6 started at 2:00 min following  regadenoson infusion at 104 bpm and persisted at least  10:55 min in recovery.  * Arrhythmia: A single VPD occurred during recovery.  * The left ventricle was small in size. There are large,  moderate to severe defects in the anteroseptal, mid to  distal anterior, distal anterolateral, and apical walls  that are mostly reversible suggestive of infarct with  significant tomi-infarct ischemia.  * Post-stress gated wall motion analysis was performed  (LVEF > 70%;LVEDV = 29 ml.) revealing hypokinesis of the  mid to distal anterior, mid to distal anteroseptal, distal  anterolateral, and apical walls.  *** No previous Nuclear/Stress exam.    LABS:   CBC Full  -  ( 05 Jun 2019 08:45 )  WBC Count : 5.99 K/uL  RBC Count : 4.61 M/uL  Hemoglobin : 13.8 g/dL  Hematocrit : 42.1 %  Platelet Count - Automated : 203 K/uL  Mean Cell Volume : 91.3 fl  Mean Cell Hemoglobin : 29.9 pg  Mean Cell Hemoglobin Concentration : 32.8 gm/dL    06-05    142  |  105  |  22  ----------------------------<  108<H>  4.9   |  25  |  0.92    Ca    9.5      05 Jun 2019 06:34    CARDIAC MARKERS ( 05 Jun 2019 09:28 )  x     / x     / 79 U/L / x     / x        IMPRESSION AND PLAN:  74y Female patient with past medical history of HTN, HLD presenting with hypertensive emergency and type II NSTEMI.     1) Type II NSTEMI   Secondary to uncontrolled HTN; symptoms resolve with improved BP.   CAD risk factors: HTN, HLD, age, family history   ECHO with preserved EF and no WMA.   Pharm MPI with obstructive LAD CAD  hs-troponin peak 28    ·	Recommend cardiac cath tomorrow; discussed with patient and boarded, time unknown but can call in morning and get ballpark. If in the afternoon, can have a light breakfast.   ·	Continue medical management with aspirin 81 mg daily.  ·	Increase atorvastatin to 80 mg nightly.     2) HTN   Well controlled when on home medications.   Anginal pain with elevated pressures.     ·	Continue losartan 100 mg daily, HCTZ 12.5-25 mg daily.   ·	Discontinue beta-blockers due to bradycardia.     3) HLD   CAD diagnosed; statin benefit group.     ·	Continue medical management with increased dose atorvastatin 80 mg nightly.     Florence Henriquez MD, MPH, ITZ  Cardiovascular Specialist Attending  [facility]  C: [phone]  E: [email or web address]  (Cardiology nocturnist available every night 7 pm - 7 am; available week days for follow-up; general cardiology consult coverage weekend days)

## 2019-06-05 NOTE — PROGRESS NOTE ADULT - SUBJECTIVE AND OBJECTIVE BOX
Patient is a 74y old  Female who presents with a chief complaint of hypertension (04 Jun 2019 15:46)      SUBJECTIVE / OVERNIGHT EVENTS:    patient seen and examined at bedside.   feels this neck tightening and this weird sensation comes over her lasts for a few seconds    ROS:  14 point ROS negative in detail except stated as above    MEDICATIONS  (STANDING):  aspirin  chewable 81 milliGRAM(s) Oral daily  atorvastatin 20 milliGRAM(s) Oral at bedtime  losartan 100 milliGRAM(s) Oral daily  pantoprazole  Injectable 40 milliGRAM(s) IV Push daily    MEDICATIONS  (PRN):  acetaminophen   Tablet .. 650 milliGRAM(s) Oral every 6 hours PRN Mild Pain (1 - 3)      CAPILLARY BLOOD GLUCOSE        I&O's Summary    04 Jun 2019 07:01  -  05 Jun 2019 07:00  --------------------------------------------------------  IN: 1305 mL / OUT: 300 mL / NET: 1005 mL    05 Jun 2019 07:01  -  05 Jun 2019 11:06  --------------------------------------------------------  IN: 240 mL / OUT: 0 mL / NET: 240 mL        PHYSICAL EXAM:  Vital Signs Last 24 Hrs  T(C): 36.6 (05 Jun 2019 05:26), Max: 36.9 (04 Jun 2019 14:05)  T(F): 97.9 (05 Jun 2019 05:26), Max: 98.4 (04 Jun 2019 14:05)  HR: 76 (05 Jun 2019 08:31) (57 - 79)  BP: 164/73 (05 Jun 2019 08:31) (113/68 - 164/73)  BP(mean): --  RR: 18 (05 Jun 2019 05:26) (18 - 18)  SpO2: 98% (05 Jun 2019 05:26) (95% - 98%)    GENERAL: NAD, well-developed  HEAD:  Atraumatic, Normocephalic  EYES: EOMI, PERRL, conjunctiva and sclera clear  NECK: Supple, No JVD  CHEST/LUNG: Clear to auscultation bilaterally; No wheeze  HEART: Regular rate and rhythm; No murmurs, rubs, or gallops  ABDOMEN: Soft, Nontender, Nondistended; Bowel sounds present  EXTREMITIES:  2+ Peripheral Pulses, No clubbing, cyanosis, or edema  NEUROLOGY: AAOx3; non-focal  SKIN: No rashes or lesions      LABS:                        13.8   5.99  )-----------( 203      ( 05 Jun 2019 08:45 )             42.1     06-05    142  |  105  |  22  ----------------------------<  108<H>  4.9   |  25  |  0.92    Ca    9.5      05 Jun 2019 06:34        CARDIAC MARKERS ( 05 Jun 2019 09:28 )  x     / x     / 79 U/L / x     / x              RADIOLOGY & ADDITIONAL TESTS:    Imaging Personally Reviewed:    Consultant(s) Notes Reviewed:      Care Discussed with Consultants/Other Providers:

## 2019-06-05 NOTE — PROGRESS NOTE ADULT - ASSESSMENT
73 y/o F w/ PMHx of  recently diagnosed HTN( 5/2/19) and HLD. Admitted w/ Hypertensive urgency, after discontinuing her Metoprolol for 3-4 days and missing a dose of her Losartan-HCTz,  and neck pain radiating to bilateral shoulders). ( note that  patient admits to taking x1 dose of her Metoprolol and her losartan-HCTz pill on day of presentation to ED, 6/1, in an attempt to decrease her BP after noting it to be very high).  Noted w/ two episodes of mobitz type 1 on telemetry. At time of 2nd episode patient w/ symptom of lightheadedness similar to lightheadedness experienced while taking metoprolol.       # Symptomatic second degree AV block type 1   - Still with periods of Wenckebach occurring at night time only, appears nocturnal  - Continue to hold off on all AVN blockers  - Diuretic/HCTZ on hold  - Continue telemetry monitoring  - TTE from yesterday with EF 80-85%, hyperdynamic LV  - NST today r/o ischemia/[place] CAD    #[id number]

## 2019-06-05 NOTE — CHART NOTE - NSCHARTNOTEFT_GEN_A_CORE
RN notified PA of patient c/o neck pain radiating to b/l arms. Pt seen and assessed at bedside.   Pt reports 5/10 L sided neck discomfort located under her chin described as an ache that radiates to her L suprascapular region.   Reports she feels weakness in her lower extremities that is brought on shortly after the onset. Pt states she noticed this started after she ate breakfast.   Endorses she does occasionally get palpitations during exacerbation.   Denies chest pain, SOB, dizziness, blurry vision, headache, LE edema, abdominal pain, or nausea/vomiting.     Physical Exam:   Vital Signs Last 24 Hrs  T(C): 36.6 (05 Jun 2019 05:26), Max: 36.9 (04 Jun 2019 14:05)  T(F): 97.9 (05 Jun 2019 05:26), Max: 98.4 (04 Jun 2019 14:05)  HR: 76 (05 Jun 2019 08:31) (57 - 79)  BP: 164/73 (05 Jun 2019 08:31) (113/68 - 164/73)  BP(mean): --  RR: 18 (05 Jun 2019 05:26) (18 - 18)  SpO2: 98% (05 Jun 2019 05:26) (95% - 98%)    General: well-developed, well -nourished female laying in bed in NAD.   Cardio: regular rate and rhythm. Occasional episodes of Mobitz Type I overnight. No murmurs, rubs, or gallops.   Lungs: CTA b/l/ No wheezing, rales or crackles.   GI: Abdomen Soft, NT/ND. BS x4.   Extremities: No erythema or edema, 2+ distal pulses.   Neuro: No focal deficits. Strength UE/LE 5+/5+ b/l     A/P: 73 y/o F w/ PMHx HTN and HLD p/w HTN urgency, now under control, noted with bradycardia/Wenckebach now with atypical discomfort and weakness.   - EKG performed. Showing no acute JILLIAN/ischemic changes. Reviewed with Dr. Florence Henriquez.   - Hst STAT: 20 --> 18 --> 10 (6/5)   - Will trial Pepcid IV 20mg. Add Protonix 40mg QD.   - Will try giving Tylenol for pain relief.   - To go for NST today, will f/u results.     D/w Dr. Patel and Dr. Henriquez.     Sandy Gama PA-C   Dept of Medicine   Spectra: [id number]

## 2019-06-06 DIAGNOSIS — I25.10 ATHEROSCLEROTIC HEART DISEASE OF NATIVE CORONARY ARTERY WITHOUT ANGINA PECTORIS: ICD-10-CM

## 2019-06-06 LAB
ANION GAP SERPL CALC-SCNC: 14 MMOL/L — SIGNIFICANT CHANGE UP (ref 5–17)
BUN SERPL-MCNC: 16 MG/DL — SIGNIFICANT CHANGE UP (ref 7–23)
CALCIUM SERPL-MCNC: 10 MG/DL — SIGNIFICANT CHANGE UP (ref 8.4–10.5)
CHLORIDE SERPL-SCNC: 103 MMOL/L — SIGNIFICANT CHANGE UP (ref 96–108)
CO2 SERPL-SCNC: 22 MMOL/L — SIGNIFICANT CHANGE UP (ref 22–31)
CREAT SERPL-MCNC: 0.81 MG/DL — SIGNIFICANT CHANGE UP (ref 0.5–1.3)
GLUCOSE SERPL-MCNC: 103 MG/DL — HIGH (ref 70–99)
HCT VFR BLD CALC: 42.3 % — SIGNIFICANT CHANGE UP (ref 34.5–45)
HGB BLD-MCNC: 14.2 G/DL — SIGNIFICANT CHANGE UP (ref 11.5–15.5)
MCHC RBC-ENTMCNC: 31 PG — SIGNIFICANT CHANGE UP (ref 27–34)
MCHC RBC-ENTMCNC: 33.5 GM/DL — SIGNIFICANT CHANGE UP (ref 32–36)
MCV RBC AUTO: 92.3 FL — SIGNIFICANT CHANGE UP (ref 80–100)
PLATELET # BLD AUTO: 210 K/UL — SIGNIFICANT CHANGE UP (ref 150–400)
POTASSIUM SERPL-MCNC: 3.6 MMOL/L — SIGNIFICANT CHANGE UP (ref 3.5–5.3)
POTASSIUM SERPL-SCNC: 3.6 MMOL/L — SIGNIFICANT CHANGE UP (ref 3.5–5.3)
RBC # BLD: 4.58 M/UL — SIGNIFICANT CHANGE UP (ref 3.8–5.2)
RBC # FLD: 12.2 % — SIGNIFICANT CHANGE UP (ref 10.3–14.5)
SODIUM SERPL-SCNC: 139 MMOL/L — SIGNIFICANT CHANGE UP (ref 135–145)
WBC # BLD: 6.7 K/UL — SIGNIFICANT CHANGE UP (ref 3.8–10.5)
WBC # FLD AUTO: 6.7 K/UL — SIGNIFICANT CHANGE UP (ref 3.8–10.5)

## 2019-06-06 PROCEDURE — 92928 PRQ TCAT PLMT NTRAC ST 1 LES: CPT | Mod: LD,GC

## 2019-06-06 PROCEDURE — 93010 ELECTROCARDIOGRAM REPORT: CPT

## 2019-06-06 PROCEDURE — 93454 CORONARY ARTERY ANGIO S&I: CPT | Mod: 26,59,GC

## 2019-06-06 PROCEDURE — 99233 SBSQ HOSP IP/OBS HIGH 50: CPT

## 2019-06-06 PROCEDURE — 99152 MOD SED SAME PHYS/QHP 5/>YRS: CPT | Mod: GC

## 2019-06-06 RX ORDER — CLOPIDOGREL BISULFATE 75 MG/1
75 TABLET, FILM COATED ORAL DAILY
Refills: 0 | Status: DISCONTINUED | OUTPATIENT
Start: 2019-06-06 | End: 2019-06-08

## 2019-06-06 RX ORDER — ATORVASTATIN CALCIUM 80 MG/1
40 TABLET, FILM COATED ORAL AT BEDTIME
Refills: 0 | Status: DISCONTINUED | OUTPATIENT
Start: 2019-06-06 | End: 2019-06-08

## 2019-06-06 RX ADMIN — LOSARTAN POTASSIUM 100 MILLIGRAM(S): 100 TABLET, FILM COATED ORAL at 05:17

## 2019-06-06 RX ADMIN — PANTOPRAZOLE SODIUM 40 MILLIGRAM(S): 20 TABLET, DELAYED RELEASE ORAL at 17:35

## 2019-06-06 RX ADMIN — ATORVASTATIN CALCIUM 40 MILLIGRAM(S): 80 TABLET, FILM COATED ORAL at 21:09

## 2019-06-06 NOTE — PROGRESS NOTE ADULT - PROBLEM SELECTOR PLAN 5
atypical chest pain , T wave inversions in leads aVR, V1 and V2, 1mm ST elevation in leads III and aVF  NST pending  Normal mitral valve. Minimal mitral regurgitation. Hyperdynamic left ventricular systolic function. Normal right ventricular size and function. Thickened pericardium  follow up cards recs  troponins downtrended

## 2019-06-06 NOTE — PROVIDER CONTACT NOTE (OTHER) - ACTION/TREATMENT ORDERED:
Continue to monitor, no new orders at this time.
EKG ordered
PA made aware. R2 pads at bedside. Will continue to monitor patient.
PA made aware. R2pads at bedside. Will continue to monitor patient.
Pending ct coronaries and echo, awaiting further orders.
no further orders at this time. R2 pads at bedside. will continue to monitor & will notify provider for changes in pt status
put R2 pads at bedside, defibrillator outside room. no further orders at this time. will continue to monitor & will notify provider for changes in pt status
EKG
PA came to bedside, labs ordered. Will refer with cardiology. Continue to monitor patient at this time.
PA made aware. PA at bedside. EKG ordered. Will go for cath in AM. Will continue to monitor patient.
continue to monitor.

## 2019-06-06 NOTE — PROGRESS NOTE ADULT - SUBJECTIVE AND OBJECTIVE BOX
INTERVAL HPI/OVERNIGHT EVENTS: Patient seen and examined, lying in bed, in no acute distress. She denies chest pain/sob/dizziness/ palpitations.     MEDICATIONS  (STANDING):  aspirin  chewable 81 milliGRAM(s) Oral daily  atorvastatin 20 milliGRAM(s) Oral at bedtime  losartan 100 milliGRAM(s) Oral daily  pantoprazole  Injectable 40 milliGRAM(s) IV Push daily    MEDICATIONS  (PRN):  acetaminophen   Tablet .. 650 milliGRAM(s) Oral every 6 hours PRN Mild Pain (1 - 3)      Allergies    No Known Allergies    Intolerances      ROS:  General: Pt denies fevers/constitutional discomfort.   Cardiovascular: denies chest pain/palpitations/dizziness  Respiratory and Thorax: denies SOB  Gastrointestinal: denies abdominal pain/diarrhea/bloody stools  Genitourinary: denies dysuria/ hematuria  Hematologic: denies abnormal bleeding    Vital Signs Last 24 Hrs  T(C): 36.7 (2019 04:20), Max: 36.7 (2019 16:14)  T(F): 98 (2019 04:20), Max: 98 (2019 16:14)  HR: 71 (2019 04:20) (64 - 74)  BP: 147/78 (2019 04:20) (136/68 - 164/77)  BP(mean): --  RR: 18 (2019 04:20) (18 - 18)  SpO2: 98% (2019 04:20) (96% - 98%)    Physical Exam:  Constitutional: well developed, well nourished and in no acute distress  Neurological: Alert & Oriented x 3,  no focal deficits  Respiratory: Breathing nonlabored; CTA bilaterally   Cardiovascular: (+) S1 & S2  Gastrointestinal: soft, NT/no rebound, nondistended, (+) BS  Extremities: +2 bilateral radial and DP pulses. No pedal edema.   Skin:  normal skin color and pigmentation, no skin lesions noted.       LABS:                        14.2   6.7   )-----------( 210      ( 2019 06:52 )             42.3         139  |  103  |  16  ----------------------------<  103<H>  3.6   |  22  |  0.81    Ca    10.0      2019 06:52            RADIOLOGY & ADDITIONAL TESTS: < from: Transthoracic Echocardiogram (19 @ 17:18) >  Patient name: YANIV JAQUEZ  YOB: 1945   Age: 74 (F)   MR#: [id number]  Study Date: 6/3/2019  [facility]  Study quality: Technically difficult  Referring Physician: Adrian Swenson MD  Blood Pressure: 112/70 mmHg  Height: 157 cm  Weight: 58 kg  BSA: 1.6 m2  Heart Rate: 74 mmHg  ------------------------------------------------------------------------  PROCEDURE: Transthoracic echocardiogram with 2-D, M-Mode  and complete spectral and colorflow Doppler.  INDICATION: Hypertensive heart disease without heart  failure (I11.9)  ------------------------------------------------------------------------  Dimensions:    Normal Values:  LA:     3.0    2.0 - 4.0 cm  Ao:     2.7    2.0 - 3.8 cm  SEPTUM: 1.0    0.6 - 1.2 cm  PWT:    1.1    0.6 - 1.1 cm  LVIDd:  3.1    3.0 - 5.6 cm  LVIDs:  1.8    1.8 - 4.0 cm  Derived variables:  LVMI: 65 g/m2  RWT: 0.72  Fractional short: 43 %  EF (Visual Estimate): 80-85 %  ------------------------------------------------------------------------  Observations:  Mitral Valve: Normal mitral valve. Minimal mitral  regurgitation.  Aortic Valve/Aorta: Aortic valve not well visualized;  probably normal. No aortic valve regurgitation seen. Peak  left ventricularoutflow tract gradient equals 7 mm Hg.  No  significant LVOT obstruction.  Aortic Root: 2.7 cm.  Left Atrium: LA volume index = 14 cc/m2.  Left Ventricle: Hyperdynamic left ventricular systolic  function. Normal left ventricular internal dimensions and  wall thickness. Indeterminate diastolic function.  Right Heart: Normal right atrium. Normal right ventricular  size and function. Normal tricuspid valve. Mild tricuspid  regurgitation. Pulmonic valve not well visualized, probably  normal. No pulmonic regurgitation.  Pericardium/Pleura: Thickened pericardium. Trace  pericardial effusion seen.  Hemodynamic: Estimated right ventricular systolic pressure  equals 21 mm Hg, assuming right atrial pressure equals 3 mm  Hg, consistent with normal pulmonary pressures. Color  Doppler demonstrates no evidence of a patent foramen ovale.  ------------------------------------------------------------------------  Conclusions:  1. Normal mitral valve. Minimal mitral regurgitation.  2. Aortic valve not wellvisualized; probably normal. No  aortic valve regurgitation seen.  3. Hyperdynamic left ventricular systolic function.  4. Normal right ventricular size and function.  5. Thickened pericardium. Trace pericardial effusion seen.  *** No previous Echo exam.  ------------------------------------------------------------------------  Confirmed on  6/3/2019 - 22:03:37 by Hayden Ojeda M.D.  ------------------------------------------------------------------------    < from: Nuclear Stress Test-Pharmacologic (19 @ 13:42) >  PATIENT: YANIV JAQUEZ  : 1945   AGE: 74 (F)   MR#: [id number]  STUDY DATE: 2019  LOCATION: [address] [place] [address]  REF. PHYSICIAN(S): Cha Patel MD  FELLOW: Erin Alonzo NP, NP  ------------------------------------------------------------------------  TYPE OF TEST: Rest/Stress Pharmacologic  INDICATION: Other chest pain (R07.89), Abnormal  electrocardiogram (ECG) (EKG) (R94.31)  ------------------------------------------------------------------------  HISTORY:  CARDIAC HISTORY: 74 year old female with HTN, HLD,  hyperdynamic LV admitted from ER and presents for ischemic  evalution due to reports of throat tightness a/w  hypertensive urgency and palpitations.  Pt noted to have  cardiac arrhythmia.  RISK FACTORS: Elevated Cholesterol, Hypertension, Post  Menopausal, Family History  MEDICATIONS: ASA, Tylenol, Pantoprazole, Atorvastatin,  Losartan  ------------------------------------------------------------------------  BASELINE ELECTROCARDIOGRAM:  Rhythm: Sinus Rhythm - 65 BPM  Conduction Defect: Early repolarization  Q Waves: III and aVF  ST: Nonspecific ST-T wave abnormality.  ------------------------------------------------------------------------  HEMODYNAMIC PARAMETERS:                                      HR      BP  Baseline  Pre-Injection             67  183/74  00:00     Inject Regadenoson        67  183/74  00:30     Post Injection            67  183/74  01:00     Post Injection            81  167/70  02:00     Post Injection           104  157/74  03:00Post Injection           101  148/87  04:00     Post Injection           107  151/77  05:00     Recovery                  93  158/68  06:00     Recovery                  97  157/76  07:00     Recovery                  90  175/73  08:00     Recovery               86  190/83  11:36     Recovery                  83  190/80  ------------------------------------------------------------------------  Agent: Regadenoson 0.4 mg/5 ml NS. intravenously injected  over 10 sec.  Aminophylline: 75 mg  HR: Baseline HR: 67 bpm   Peak HR: 107 bpm (73% of MPHR)  MPHR: 146 bpm   85% of MPHR: 124 bpm  BP: Baseline BP: 183/74 mmHg   Peak BP: 183/74 mmHg   Peak  RPP: [id number] (Rate Pressure Product)  Last Caffeine intake: 12 hrs  Terminated: Completion of protocol  ------------------------------------------------------------------------  SYMPTOMS/FINDINGS:  Symptoms: Neck and chest discomfort during infusion  resolved after administration of Aminophylline during  prolonged recovery  Chest pain: Patient developed neck tightness at 2:00 min  following regadenoson infusion at a HR of 104 bpm and  chest heaviness at 4:00 min following infusion at 98 bpm  which resolved by 7:40 minutes of recovery following  administration of aminophylline.  ------------------------------------------------------------------------  ECG ABNORMALITIES DURING/AFTER STRESS:   ST Changes:ST Depression: 1 mm downsloping in leads II,  III, aVF, and V3-V6 started at 2:00 min following  regadenoson infusion at 104 bpm and persisted atleast  10:55 min in recovery.  ------------------------------------------------------------------------  NEW ARRHYTHMIAS DEVELOPED DURING/AFTER STRESS:  A single VPD occurred during recovery.  ------------------------------------------------------------------------  STRESS TEST IMPRESSIONS:  Chest Pain: Patient developed neck tightness at 2:00 min  following regadenoson infusion at a HR of 104 bpm and  chest heaviness at 4:00 min following infusion at 98 bpm  which resolved by 7:40 minutes of recovery following  administration of aminophylline.  Symptom: Neck and chest discomfort during infusion  resolved after administration of Aminophylline during  prolonged recovery.  HR Response: Appropriate.  BP Response: Appropriate.  Heart Rhythm: Sinus Rhythm - 65 BPM.  Conduction defects: Early repolarization.  Q Waves: III and aVF.  Baseline ECG: Nonspecific ST-T wave abnormality.  ECG Changes: ST Depression: 1 mm downsloping in leads II,  III, aVF, and V3-V6 started at 2:00 min following  regadenoson infusion at 104 bpm and persisted at least  10:55 min in recovery.  Arrhythmia: A single VPD occurred during recovery.  ------------------------------------------------------------------------  PROCEDURE:  6.9 mCi of Tc99m Sestamibi were injected intravenously at  rest. Approximately 55 minutes later, tomographic images  were obtained in a 180 degree arc from right anterior  oblique to left anterior oblique with 64 stops. At a  separate time, 17.4 mCi of Tc99m Sestamibi were injected  intravenously during stress protocol. Approximately 110  minute(s) later, tomographic images were obtained in a 180  degree arc from right anterior oblique to left anterior  oblique with 64 stops. The tomographic slices were  reconstructed in 3 orthogonal planes (short axis,  horizontal long axis and vertical long axis).  Interpretation was performed both by visual and  quantitative analysis.  Rest and stress images were acquired using CZT-based  system with pinhole collimation ([employer] 530 c, [employer]), and reconstructed using MLEM algorithm.  Images were re-acquired with the patient in a prone  position.  ------------------------------------------------------------------------  NUCLEAR FINDINGS:  The left ventricle was small in size. There are large,  moderate to severe defects in the anteroseptal, mid to  distal anterior, distal anterolateral, and apical walls  that are mostly reversible suggestive of infarct with  significant tomi-infarct ischemia.  ------------------------------------------------------------------------  GATED ANALYSIS:  Post-stress gated wall motion analysis was performed (LVEF  > 70%;LVEDV = 29 ml.) revealing hypokinesis of the mid to  distal anterior, mid to distal anteroseptal, distal  anterolateral, andapical walls.  ------------------------------------------------------------------------  IMPRESSIONS:Abnormal Study  * Chest Pain: Patient developed neck tightness at 2:00 min  following regadenoson infusion at a HR of 104 bpm and  chest heaviness at4:00 min following infusion at 98 bpm  which resolved by 7:40 minutes of recovery following  administration of aminophylline.  * Symptom: Neck and chest discomfort during infusion  resolved after administration of Aminophylline during  prolonged recovery.  * HR Response: Appropriate.  * BP Response: Appropriate.  * Heart Rhythm: Sinus Rhythm - 65 BPM.  * Conduction defects: Early repolarization.  * Q Waves: III and aVF.  * Baseline ECG: Nonspecific ST-T wave abnormality.  * ECG Changes: ST Depression: 1 mm downsloping in leads  II, III, aVF, and V3-V6 started at 2:00 min following  regadenoson infusion at 104 bpm and persisted at least  10:55 min in recovery.  * Arrhythmia: A single VPD occurred during recovery.  * The left ventricle was small in size. There are large,  moderate to severe defects in the anteroseptal, mid to  distal anterior, distal anterolateral, and apical walls  that are mostly reversible suggestive of infarct with  significant tomi-infarct ischemia.  * Post-stress gated wall motion analysis was performed  (LVEF > 70%;LVEDV = 29 ml.) revealing hypokinesis of the  mid to distal anterior, mid to distal anteroseptal, distal  anterolateral, and apical walls.  *** No previous Nuclear/Stress exam.  ------------------------------------------------------------------------  Confirmed on  2019 - 15:49:15 by Alberto Elkins M.D.  ------------------------------------------------            TELE: SB/SR 50's-70's with one brief  episode of second degree AV block type 1 w/ HR  37bpm overnight.

## 2019-06-06 NOTE — PROGRESS NOTE ADULT - ASSESSMENT
75 y/o F with a PMH significant for recently diagnosed HTN and HLD who presents to the hospital with high blood pressure, admitted for hypertensive urgency and atypical cp

## 2019-06-06 NOTE — PROGRESS NOTE ADULT - SUBJECTIVE AND OBJECTIVE BOX
Patient is a 74y old  Female who presents with a chief complaint of CP (05 Jun 2019 18:36)      SUBJECTIVE / OVERNIGHT EVENTS:    patient seen and examined in cardiac cath recovery. feels well. no cp, sob, pain.    ROS:  14 point ROS negative in detail except stated as above    MEDICATIONS  (STANDING):  aspirin  chewable 81 milliGRAM(s) Oral daily  atorvastatin 40 milliGRAM(s) Oral at bedtime  clopidogrel Tablet 75 milliGRAM(s) Oral daily  losartan 100 milliGRAM(s) Oral daily  pantoprazole  Injectable 40 milliGRAM(s) IV Push daily    MEDICATIONS  (PRN):  acetaminophen   Tablet .. 650 milliGRAM(s) Oral every 6 hours PRN Mild Pain (1 - 3)      CAPILLARY BLOOD GLUCOSE        I&O's Summary    05 Jun 2019 07:01  -  06 Jun 2019 07:00  --------------------------------------------------------  IN: 1080 mL / OUT: 300 mL / NET: 780 mL    06 Jun 2019 07:01  -  06 Jun 2019 15:59  --------------------------------------------------------  IN: 240 mL / OUT: 0 mL / NET: 240 mL        PHYSICAL EXAM:  Vital Signs Last 24 Hrs  T(C): 36.7 (06 Jun 2019 04:20), Max: 36.7 (05 Jun 2019 16:14)  T(F): 98 (06 Jun 2019 04:20), Max: 98 (05 Jun 2019 16:14)  HR: 71 (06 Jun 2019 04:20) (64 - 71)  BP: 147/78 (06 Jun 2019 04:20) (136/68 - 164/77)  BP(mean): --  RR: 18 (06 Jun 2019 04:20) (18 - 18)  SpO2: 98% (06 Jun 2019 04:20) (96% - 98%)    GENERAL: NAD, well-developed  HEAD:  Atraumatic, Normocephalic  EYES: EOMI, PERRL, conjunctiva and sclera clear  NECK: Supple, No JVD  CHEST/LUNG: Clear to auscultation bilaterally; No wheeze  HEART: Regular rate and rhythm; No murmurs, rubs, or gallops  ABDOMEN: Soft, Nontender, Nondistended; Bowel sounds present  EXTREMITIES:  2+ Peripheral Pulses, No clubbing, cyanosis, or edema   NEUROLOGY: AAOx3; non-focal  SKIN: No rashes or lesions    LABS:                        14.2   6.7   )-----------( 210      ( 06 Jun 2019 06:52 )             42.3     06-06    139  |  103  |  16  ----------------------------<  103<H>  3.6   |  22  |  0.81    Ca    10.0      06 Jun 2019 06:52        CARDIAC MARKERS ( 05 Jun 2019 09:28 )  x     / x     / 79 U/L / x     / x              RADIOLOGY & ADDITIONAL TESTS:    Imaging Personally Reviewed:    Consultant(s) Notes Reviewed:      Care Discussed with Consultants/Other Providers:

## 2019-06-06 NOTE — PROGRESS NOTE ADULT - ASSESSMENT
73 y/o F w/ PMHx of  recently diagnosed HTN( 5/2/19) and HLD. Admitted w/ Hypertensive urgency, after discontinuing her Metoprolol for 3-4 days and missing a dose of her Losartan-HCTz,  and neck pain radiating to bilateral shoulders). ( note that  patient admits to taking x1 dose of her Metoprolol and her losartan-HCTz, pill on day of presentation to ED, 6/1, in an attempt to decrease her BP after noting it to be very high).  Noted w/ two episodes of mobitz type 1 on telemetry 6/3/19; At time of 2nd episode patient w/ symptom of lightheadedness similar to lightheadedness experienced while taking metoprolol.       # Symptomatic Second degree AV block type 1   - Continue to hold off on all AVN blockers  _ EF 80-85%  - + Stress test; for cardiac cath today  - Continue telemetry monitoring  - EP will continue to follow    [id number]

## 2019-06-07 ENCOUNTER — TRANSCRIPTION ENCOUNTER (OUTPATIENT)
Age: 74
End: 2019-06-07

## 2019-06-07 LAB
ANION GAP SERPL CALC-SCNC: 15 MMOL/L — SIGNIFICANT CHANGE UP (ref 5–17)
BUN SERPL-MCNC: 20 MG/DL — SIGNIFICANT CHANGE UP (ref 7–23)
CALCIUM SERPL-MCNC: 9.5 MG/DL — SIGNIFICANT CHANGE UP (ref 8.4–10.5)
CHLORIDE SERPL-SCNC: 102 MMOL/L — SIGNIFICANT CHANGE UP (ref 96–108)
CO2 SERPL-SCNC: 23 MMOL/L — SIGNIFICANT CHANGE UP (ref 22–31)
CREAT SERPL-MCNC: 1 MG/DL — SIGNIFICANT CHANGE UP (ref 0.5–1.3)
GLUCOSE SERPL-MCNC: 108 MG/DL — HIGH (ref 70–99)
HCT VFR BLD CALC: 41.5 % — SIGNIFICANT CHANGE UP (ref 34.5–45)
HGB BLD-MCNC: 13.7 G/DL — SIGNIFICANT CHANGE UP (ref 11.5–15.5)
MCHC RBC-ENTMCNC: 29.9 PG — SIGNIFICANT CHANGE UP (ref 27–34)
MCHC RBC-ENTMCNC: 32.9 GM/DL — SIGNIFICANT CHANGE UP (ref 32–36)
MCV RBC AUTO: 90.9 FL — SIGNIFICANT CHANGE UP (ref 80–100)
PLATELET # BLD AUTO: 215 K/UL — SIGNIFICANT CHANGE UP (ref 150–400)
POTASSIUM SERPL-MCNC: 3.7 MMOL/L — SIGNIFICANT CHANGE UP (ref 3.5–5.3)
POTASSIUM SERPL-SCNC: 3.7 MMOL/L — SIGNIFICANT CHANGE UP (ref 3.5–5.3)
RBC # BLD: 4.57 M/UL — SIGNIFICANT CHANGE UP (ref 3.8–5.2)
RBC # FLD: 12.1 % — SIGNIFICANT CHANGE UP (ref 10.3–14.5)
SODIUM SERPL-SCNC: 140 MMOL/L — SIGNIFICANT CHANGE UP (ref 135–145)
WBC # BLD: 8.8 K/UL — SIGNIFICANT CHANGE UP (ref 3.8–10.5)
WBC # FLD AUTO: 8.8 K/UL — SIGNIFICANT CHANGE UP (ref 3.8–10.5)

## 2019-06-07 PROCEDURE — 99233 SBSQ HOSP IP/OBS HIGH 50: CPT

## 2019-06-07 PROCEDURE — 92928 PRQ TCAT PLMT NTRAC ST 1 LES: CPT | Mod: LC,GC

## 2019-06-07 PROCEDURE — 99152 MOD SED SAME PHYS/QHP 5/>YRS: CPT | Mod: GC

## 2019-06-07 PROCEDURE — 93010 ELECTROCARDIOGRAM REPORT: CPT

## 2019-06-07 RX ADMIN — LOSARTAN POTASSIUM 100 MILLIGRAM(S): 100 TABLET, FILM COATED ORAL at 05:51

## 2019-06-07 RX ADMIN — CLOPIDOGREL BISULFATE 75 MILLIGRAM(S): 75 TABLET, FILM COATED ORAL at 11:42

## 2019-06-07 RX ADMIN — ATORVASTATIN CALCIUM 40 MILLIGRAM(S): 80 TABLET, FILM COATED ORAL at 21:03

## 2019-06-07 RX ADMIN — PANTOPRAZOLE SODIUM 40 MILLIGRAM(S): 20 TABLET, DELAYED RELEASE ORAL at 11:42

## 2019-06-07 RX ADMIN — Medication 81 MILLIGRAM(S): at 11:42

## 2019-06-07 NOTE — PROGRESS NOTE ADULT - ATTENDING COMMENTS
Thank you. My team will follow.    Vega Moreira M.D, F.A.C.C  [facility]   [phone]
Thank you. My team will follow.    Vega Moreira M.D, F.A.C.C  [facility]   [phone]
dispo: pending pci today, likely can be d/c in am /no needs

## 2019-06-07 NOTE — CHART NOTE - NSCHARTNOTEFT_GEN_A_CORE
Patient underwent a PCI procedure and is being admitted as they are at increased risk for major adverse cardiac and vascular events if discharged due to the following high risk characteristics:  already inpatient, staged PCI

## 2019-06-07 NOTE — PROGRESS NOTE ADULT - PROBLEM SELECTOR PROBLEM 5
Chest pain
Chest pain
HLD (hyperlipidemia)
HLD (hyperlipidemia)
Need for prophylactic measure
Prophylactic measure
Prophylactic measure

## 2019-06-07 NOTE — PROGRESS NOTE ADULT - PROBLEM SELECTOR PLAN 3
on ckd stage 2  continue to hold hctz  improved was likey due to hctz
- patient initially presented with elevated BP > 180 systolic but denying symptoms of headache, vision changes, or CP  - did have mild elevation in troponin that has downtrended and is likely secondary to demand ischemia   - continue with losartan. stopped hctz, can add hydral if needed
- patient initially presented with elevated BP > 180 systolic but denying symptoms of headache, vision changes, or CP  - did have mild elevation in troponin that has downtrended and is likely secondary to demand ischemia   - continue with losartan. stopped hctz, can add hydral if needed
Continue lipitor 20mg po daily.
atypical chest pain , T wave inversions in leads aVR, V1 and V2, 1mm ST elevation in leads III and aVF  follow up ct coronaries  2d echo  follow up cards recs  troponins downtrended
on ckd stage 2  continue to hold hctz  improved/ d/c ivf
Continue lipitor 20mg po daily.

## 2019-06-07 NOTE — PROGRESS NOTE ADULT - PROBLEM SELECTOR PLAN 2
zoltan type 1  monitor on tele  r2 pads at bedside  appreciate ep c/s continue to monitor, hold avn blocking agents  rates are much better
- patient initially presented with elevated BP > 180 systolic but denying symptoms of headache, vision changes, or CP  - did have mild elevation in troponin that has downtrended and is likely secondary to demand ischemia vs NSTEMI  - continue with losartan, d/c hctz can add hydral as needed
Controlled on HCTZ 25mg po daily, losartan 100mg po daily. Continue to monitor.
on ckd stage 2  continue to hold hctz  improved/ d/c ivf
on ckd stage 2  will hold diuretic, hctz
zoltan type 1  monitor on tele  r2 pads at bedside  appreciate ep c/s continue to monitor, hold avn blocking agents
Controlled on HCTZ 25mg po daily, losartan 100mg po daily. Continue to monitor.

## 2019-06-07 NOTE — DISCHARGE NOTE NURSING/CASE MANAGEMENT/SOCIAL WORK - NSDCDPATPORTLINK_GEN_ALL_CORE
You can access the [facility] Patient Portal, offered by Health system, by registering with the following website: [email or web address]

## 2019-06-07 NOTE — CHART NOTE - NSCHARTNOTEFT_GEN_A_CORE
s/p staged PCI/LIZET to LCX  Tolerated procedure well- no chest pain or dyspena    right groin with percose closure  no hematoma, no bleeding     ASA/Plavix/Statin on discharge s/p staged PCI/LIZET to LCX  Tolerated procedure well- no chest pain or dyspena    right groin with percose closure  no hematoma, no bleeding     ASA/Plavix/Statin on discharge  Report to [facility] NP

## 2019-06-07 NOTE — PROGRESS NOTE ADULT - PROBLEM SELECTOR PLAN 4
- patient initially presented with elevated BP > 180 systolic but denying symptoms of headache, vision changes, or CP  - did have mild elevation in troponin that has downtrended and is likely secondary to demand ischemia   - continue with losartan. stopped hctz, can add hydral if needed
- patient initially presented with elevated BP > 180 systolic but denying symptoms of headache, vision changes, or CP  - did have mild elevation in troponin that has downtrended and is likely secondary to demand ischemia   - continue with losartan. stopped hctz, can add hydral if needed
- stable  - continue atorvastatin 20mg daily
Episode of Mobitz type I observed on telemetry. Avoid AV claire blocking agents. Stress test has been ordered to r/o CAD and monitor rhythm during exercise.
atypical chest pain , T wave inversions in leads aVR, V1 and V2, 1mm ST elevation in leads III and aVF  NST pending  Normal mitral valve. Minimal mitral regurgitation. Hyperdynamic left ventricular systolic function. Normal right ventricular size and function. Thickened pericardium  follow up cards recs  troponins downtrended
atypical chest pain , T wave inversions in leads aVR, V1 and V2, 1mm ST elevation in leads III and aVF  NST pending  Normal mitral valve. Minimal mitral regurgitation. Hyperdynamic left ventricular systolic function. Normal right ventricular size and function. Thickened pericardium  follow up cards recs  troponins downtrended
Episode of Mobitz type I observed on telemetry. Avoid AV claire blocking agents. Stress test has been ordered to r/o CAD and monitor rhythm during exercise. EPS on case. No further episodes of dizziness.

## 2019-06-07 NOTE — PROGRESS NOTE ADULT - PROBLEM SELECTOR PLAN 5
atypical chest pain , T wave inversions in leads aVR, V1 and V2, 1mm ST elevation in leads III and aVF  Normal mitral valve. Minimal mitral regurgitation. Hyperdynamic left ventricular systolic function. Normal right ventricular size and function. Thickened pericardium  follow up cards recs  6/6 s/p PCI to % x2 and staged tomorrow to prox and sital Cx.

## 2019-06-07 NOTE — PROGRESS NOTE ADULT - SUBJECTIVE AND OBJECTIVE BOX
INTERVAL HPI/OVERNIGHT EVENTS: Patient undergoing staged PCI; s/p LIZET to LAD. For intervention to L circ today. She denies chest pain/sob/dizziness/palpitations.     MEDICATIONS  (STANDING):  aspirin  chewable 81 milliGRAM(s) Oral daily  atorvastatin 40 milliGRAM(s) Oral at bedtime  clopidogrel Tablet 75 milliGRAM(s) Oral daily  losartan 100 milliGRAM(s) Oral daily  pantoprazole  Injectable 40 milliGRAM(s) IV Push daily    MEDICATIONS  (PRN):  acetaminophen   Tablet .. 650 milliGRAM(s) Oral every 6 hours PRN Mild Pain (1 - 3)      Allergies  No Known Allergies    ROS:  General: Pt denies fever/chills  Cardiovascular: denies chest pain/palpitations/dizziness  Respiratory and Thorax: denies SOB  Gastrointestinal: denies abdominal pain/diarrhea/bloody stool  Hematologic: denies abnormal bleeding    Vital Signs Last 24 Hrs  T(C): 36.7 (2019 13:01), Max: 36.9 (2019 16:45)  T(F): 98 (2019 13:01), Max: 98.4 (2019 16:45)  HR: 73 (2019 13:01) (64 - 85)  BP: 121/76 (2019 13:01) (111/70 - 135/67)  BP(mean): --  RR: 18 (2019 13:01) (18 - 18)  SpO2: 97% (2019 13:01) (95% - 98%)    Physical Exam:  Constitutional: well developed, well nourished,  and no acute distress  Neurological: Alert & Oriented x 3,  no focal deficits  Respiratory: Breathing nonlabored; CTA bilaterally.   Cardiovascular: (+) S1 & S2  Gastrointestinal: softly distended, NT/ no rebound,  (+) BS  Genitourinary: non distended bladder, voiding freely  Extremities: +2 bilateral radial pulses. No pedal edema.   Skin:  Right wrist site w/ clean/ dry and intact dressing. + ecchymosis. No hematoma/ active external bleed.           LABS:                        13.7   8.8   )-----------( 215      ( 2019 06:30 )             41.5     06-07    140  |  102  |  20  ----------------------------<  108<H>  3.7   |  23  |  1.00    Ca    9.5      2019 06:30      RADIOLOGY & ADDITIONAL TESTS:  < from: Cardiac Cath Lab - Adult (19 @ 11:24) >  [facility]  Department of Cardiology  [address] [place] [address] [phone]  Cath Lab Report -- Comprehensive Report  Patient: YANIV JAQUEZ  Study date: 2019  Account number: [id number]  MR number: [id number]  : 1945  Gender: Female  Race: O  Case Physician(s):  SHY Faulkner M.D.  Fellow:  Tomi Freedman M.D.  Referring Physician:  Florence Henriquez MD  INDICATIONS: Stable angina - CCS2. High risk nuclear stress test  HISTORY: There was no prior cardiac history. The patient has hypertension  and medication-treated dyslipidemia.  PROCEDURE:  --  Left coronary angiography.  --  Right coronary angiography.  --  Intervention on proximal LAD: drug-eluting stent.  --  Intervention on mid LAD: drug-eluting stent.  TECHNIQUE: The risks and alternatives of the procedures and conscious  sedation were explained to the patient and informed consent was obtained.  Cardiac catheterization performed electively. Coronary intervention  performed electively.  Local anesthetic given. Right radial artery access. Left coronary artery  angiography. The vessel was injected utilizing a catheter. Right coronary  artery angiography. The vessel was injected utilizing a catheter.  RADIATION EXPOSURE: 14.1 min. A successful drug-eluting stent was  performed on the 100 % lesion in the proximal LAD. Following intervention  there was a 1 % residual stenosis. According to the ACC/AHA classification  system, this lesion was a type C lesion. Therewas JEREMIAH 1 flow before the  procedure and JEREMIAH 3 flow after the procedure. Vessel setup was performed.  A LAUNCHER 5FR JL3.5 guiding catheter was used to intubate the vessel.  Vessel setup was performed. A JL [id number] wire was used to cross the  lesion. Vessel setup was performed. A FIELDER XT 190CM wire was used to  cross the lesion. Balloon angioplasty was performed, using a 2.5 X 30  EUPHORA balloon, with 3 inflations and a maximum inflation pressure of 16  mindy. A 3.00 X 38 SYNERGY drug-eluting stent was placed across the lesion  and deployed at a maximum inflation pressure of 20 mindy. A successful  drug-eluting stent was performed on the 80 % lesion in the mid LAD.  Following intervention there was a 1 % residual stenosis. According to the  ACC/AHA classification system, this lesion was a type B2 lesion. There was  JEREMIAH 0 flow before the procedure and JEREMIAH 3 flow after the procedure.  Vessel setup was performed. A 6 Fr guiding catheter was used to intubate  the vessel. Balloon angioplasty was performed, using a 2.5 X 20 EUPHORA  balloon, with 1 inflations. A 2.50 X 28 SYNERGY drug-eluting stent was  placed across the lesion and deployed at a maximum inflation pressure of  20 mindy.  CONTRAST GIVEN: Omnipaque 24 ml. Omnipaque 103 ml.  MEDICATIONS GIVEN: Fentanyl, 25 mcg, IV. Midazolam, 1 mg, IV. Verapamil  (Isoptin, Calan, Covera), 2.5 mg, IA. Nitroglycerin, 200 mcg,  intracoronary. Heparin, 3000 units, IA. Heparin, 6000 units, IV.  Clopidogrel (Plavix), 600 mg, PO. Aspirin, 325 mg, PO.  CORONARY VESSELS: The coronary circulation is right dominant.  LM:   --  LM: Normal.  LAD:   --  Proximal LAD: There was a 100 % stenosis.  --  Mid LAD: There was a 80 % stenosis.  CX:   --  Proximal circumflex: There was a 70 % stenosis.  --  OM2: There was a 90 % stenosis.  RCA:   --  Proximal RCA: There was a 50 % stenosis.  COMPLICATIONS: There were no complications.  INTERVENTIONAL RECOMMENDATIONS: ASA and Plavix for 3 mths  Prepared and signed by  Curly Palencia M.D.  Signed 2019 12:32:48  HEMODYNAMIC TABLES  Pressures:  Baseline  Pressures:  - HR: 75  Pressures:  - Rhythm:  Pressures:  -- Aortic Pressure (S/D/M): 191/77/122  Pressures:  Intervention  Pressures:  - HR: 80  Pressures:  - Rhythm:  Pressures:  -- Aortic Pressure (S/D/M): 155/60/98  Outputs:  Baseline  Outputs:  -- CALCULATIONS[age]  Outputs:  -- CALCULATIONS: Body Surface Area: 1.57  Outputs:  -- CALCULATIONS: Height in cm: 157.00  Outputs:  -- CALCULATIONS: Sex: Female  Outputs:  -- CALCULATIONS: Weight in k.60    < end of copied text >      < from: Nuclear Stress Test-Pharmacologic (19 @ 13:42) >  -----------------------------------------------------------------------  IMPRESSIONS:Abnormal Study  * Chest Pain: Patient developed neck tightness at 2:00 min  following regadenoson infusion at a HR of 104 bpm and  chest heaviness at4:00 min following infusion at 98 bpm  which resolved by 7:40 minutes of recovery following  administration of aminophylline.  * Symptom: Neck and chest discomfort during infusion  resolved after administration of Aminophylline during  prolonged recovery.  * HR Response: Appropriate.  * BP Response: Appropriate.  * Heart Rhythm: Sinus Rhythm - 65 BPM.  * Conduction defects: Early repolarization.  * Q Waves: III and aVF.  * Baseline ECG: Nonspecific ST-T wave abnormality.  * ECG Changes: ST Depression: 1 mm downsloping in leads  II, III, aVF, and V3-V6 started at 2:00 min following  regadenoson infusion at 104 bpm and persisted at least  10:55 min in recovery.  * Arrhythmia: A single VPD occurred during recovery.  * The left ventricle was small in size. There are large,  moderate to severe defects in the anteroseptal, mid to  distal anterior, distal anterolateral, and apical walls  that are mostly reversible suggestive of infarct with  significant tomi-infarct ischemia.  * Post-stress gated wall motion analysis was performed  (LVEF > 70%;LVEDV = 29 ml.) revealing hypokinesis of the  mid to distal anterior, mid to distal anteroseptal, distal  anterolateral, and apical walls.  *** No previous Nuclear/Stress exam.  ------------------------------------------------------------------------  Confirmed on  2019 - 15:49:15 by Alberto Elkins M.D.  ------------------------------------------------------------------------      TELE:    CAREN: INTERVAL HPI/OVERNIGHT EVENTS: Patient undergoing staged PCI; s/p DESx2  to prox and mid LAD. For intervention to prox circ and  OM2  today. She denies chest pain/sob/dizziness/palpitations.     MEDICATIONS  (STANDING):  aspirin  chewable 81 milliGRAM(s) Oral daily  atorvastatin 40 milliGRAM(s) Oral at bedtime  clopidogrel Tablet 75 milliGRAM(s) Oral daily  losartan 100 milliGRAM(s) Oral daily  pantoprazole  Injectable 40 milliGRAM(s) IV Push daily    MEDICATIONS  (PRN):  acetaminophen   Tablet .. 650 milliGRAM(s) Oral every 6 hours PRN Mild Pain (1 - 3)      Allergies  No Known Allergies    ROS:  General: Pt denies fever/chills  Cardiovascular: denies chest pain/palpitations/dizziness  Respiratory and Thorax: denies SOB  Gastrointestinal: denies abdominal pain/diarrhea/bloody stool  Hematologic: denies abnormal bleeding    Vital Signs Last 24 Hrs  T(C): 36.7 (2019 13:01), Max: 36.9 (2019 16:45)  T(F): 98 (2019 13:01), Max: 98.4 (2019 16:45)  HR: 73 (2019 13:01) (64 - 85)  BP: 121/76 (2019 13:01) (111/70 - 135/67)  BP(mean): --  RR: 18 (2019 13:01) (18 - 18)  SpO2: 97% (2019 13:01) (95% - 98%)    Physical Exam:  Constitutional: well developed, well nourished,  and no acute distress  Neurological: Alert & Oriented x 3,  no focal deficits  Respiratory: Breathing nonlabored; CTA bilaterally.   Cardiovascular: (+) S1 & S2  Gastrointestinal: softly distended, NT/ no rebound,  (+) BS  Genitourinary: non distended bladder, voiding freely  Extremities: +2 bilateral radial pulses. No pedal edema.   Skin:  Right wrist site w/ clean/ dry and intact dressing. + ecchymosis. No hematoma/ active external bleed.           LABS:                        13.7   8.8   )-----------( 215      ( 2019 06:30 )             41.5     06-07    140  |  102  |  20  ----------------------------<  108<H>  3.7   |  23  |  1.00    Ca    9.5      2019 06:30      RADIOLOGY & ADDITIONAL TESTS:  < from: Cardiac Cath Lab - Adult (19 @ 11:24) >  [facility]  Department of Cardiology  [address] [place] [id number]  [phone]  Cath Lab Report -- Comprehensive Report  Patient: YANIV JAQUEZ  Study date: 2019  Account number: [id number]  MR number: [id number]  : 1945  Gender: Female  Race: O  Case Physician(s):  SHY aFulkner M.D.  Fellow:  Tomi Freedman M.D.  Referring Physician:  Florence Henriquez MD  INDICATIONS: Stable angina - CCS2. High risk nuclear stress test  HISTORY: There was no prior cardiac history. The patient has hypertension  and medication-treated dyslipidemia.  PROCEDURE:  --  Left coronary angiography.  --  Right coronary angiography.  --  Intervention on proximal LAD: drug-eluting stent.  --  Intervention on mid LAD: drug-eluting stent.  TECHNIQUE: The risks and alternatives of the procedures and conscious  sedation were explained to the patient and informed consent was obtained.  Cardiac catheterization performed electively. Coronary intervention  performed electively.  Local anesthetic given. Right radial artery access. Left coronary artery  angiography. The vessel was injected utilizing a catheter. Right coronary  artery angiography. The vessel was injected utilizing a catheter.  RADIATION EXPOSURE: 14.1 min. A successful drug-eluting stent was  performed on the 100 % lesion in the proximal LAD. Following intervention  there was a 1 % residual stenosis. According to the ACC/AHA classification  system, this lesion was a type C lesion. Therewas JEREMIAH 1 flow before the  procedure and JEREMIAH 3 flow after the procedure. Vessel setup was performed.  A LAUNCHER 5FR JL3.5 guiding catheter was used to intubate the vessel.  Vessel setup was performed. A JL [id number] wire was used to cross the  lesion. Vessel setup was performed. A FIELDER XT 190CM wire was used to  cross the lesion. Balloon angioplasty was performed, using a 2.5 X 30  EUPHORA balloon, with 3 inflations and a maximum inflation pressure of 16  mindy. A 3.00 X 38 SYNERGY drug-eluting stent was placed across the lesion  and deployed at a maximum inflation pressure of 20 mindy. A successful  drug-eluting stent was performed on the 80 % lesion in the mid LAD.  Following intervention there was a 1 % residual stenosis. According to the  ACC/AHA classification system, this lesion was a type B2 lesion. There was  JEREMIAH 0 flow before the procedure and JEREMIAH 3 flow after the procedure.  Vessel setup was performed. A 6 Fr guiding catheter was used to intubate  the vessel. Balloon angioplasty was performed, using a 2.5 X 20 EUPHORA  balloon, with 1 inflations. A 2.50 X 28 SYNERGY drug-eluting stent was  placed across the lesion and deployed at a maximum inflation pressure of  20 mindy.  CONTRAST GIVEN: Omnipaque 24 ml. Omnipaque 103 ml.  MEDICATIONS GIVEN: Fentanyl, 25 mcg, IV. Midazolam, 1 mg, IV. Verapamil  (Isoptin, Calan, Covera), 2.5 mg, IA. Nitroglycerin, 200 mcg,  intracoronary. Heparin, 3000 units, IA. Heparin, 6000 units, IV.  Clopidogrel (Plavix), 600 mg, PO. Aspirin, 325 mg, PO.  CORONARY VESSELS: The coronary circulation is right dominant.  LM:   --  LM: Normal.  LAD:   --  Proximal LAD: There was a 100 % stenosis.  --  Mid LAD: There was a 80 % stenosis.  CX:   --  Proximal circumflex: There was a 70 % stenosis.  --  OM2: There was a 90 % stenosis.  RCA:   --  Proximal RCA: There was a 50 % stenosis.  COMPLICATIONS: There were no complications.  INTERVENTIONAL RECOMMENDATIONS: ASA and Plavix for 3 mths  Prepared and signed by  Curly Palencia M.D.  Signed 2019 12:32:48  HEMODYNAMIC TABLES  Pressures:  Baseline  Pressures:  - HR: 75  Pressures:  - Rhythm:  Pressures:  -- Aortic Pressure (S/D/M): 191/77/122  Pressures:  Intervention  Pressures:  - HR: 80  Pressures:  - Rhythm:  Pressures:  -- Aortic Pressure (S/D/M): 155/60/98  Outputs:  Baseline  Outputs:  -- CALCULATIONS[age]  Outputs:  -- CALCULATIONS: Body Surface Area: 1.57  Outputs:  -- CALCULATIONS: Height in cm: 157.00  Outputs:  -- CALCULATIONS: Sex: Female  Outputs:  -- CALCULATIONS: Weight in k.60    < end of copied text >      < from: Nuclear Stress Test-Pharmacologic (19 @ 13:42) >  -----------------------------------------------------------------------  IMPRESSIONS:Abnormal Study  * Chest Pain: Patient developed neck tightness at 2:00 min  following regadenoson infusion at a HR of 104 bpm and  chest heaviness at4:00 min following infusion at 98 bpm  which resolved by 7:40 minutes of recovery following  administration of aminophylline.  * Symptom: Neck and chest discomfort during infusion  resolved after administration of Aminophylline during  prolonged recovery.  * HR Response: Appropriate.  * BP Response: Appropriate.  * Heart Rhythm: Sinus Rhythm - 65 BPM.  * Conduction defects: Early repolarization.  * Q Waves: III and aVF.  * Baseline ECG: Nonspecific ST-T wave abnormality.  * ECG Changes: ST Depression: 1 mm downsloping in leads  II, III, aVF, and V3-V6 started at 2:00 min following  regadenoson infusion at 104 bpm and persisted at least  10:55 min in recovery.  * Arrhythmia: A single VPD occurred during recovery.  * The left ventricle was small in size. There are large,  moderate to severe defects in the anteroseptal, mid to  distal anterior, distal anterolateral, and apical walls  that are mostly reversible suggestive of infarct with  significant tomi-infarct ischemia.  * Post-stress gated wall motion analysis was performed  (LVEF > 70%;LVEDV = 29 ml.) revealing hypokinesis of the  mid to distal anterior, mid to distal anteroseptal, distal  anterolateral, and apical walls.  *** No previous Nuclear/Stress exam.  ------------------------------------------------------------------------  Confirmed on  2019 - 15:49:15 by Alberto Elkins M.D.  ------------------------------------------------------------------------      TELE: SR/ST 60's- low 100's      EKG:

## 2019-06-07 NOTE — PROGRESS NOTE ADULT - SUBJECTIVE AND OBJECTIVE BOX
Patient is a 74y old  Female who presents with a chief complaint of CP (05 Jun 2019 18:36)      SUBJECTIVE / OVERNIGHT EVENTS:  patient seen and examined at bedside. feels well. no chest pain, sob, fever.    tele: sr 60s    ROS:  14 point ROS negative in detail except stated as above    MEDICATIONS  (STANDING):  aspirin  chewable 81 milliGRAM(s) Oral daily  atorvastatin 40 milliGRAM(s) Oral at bedtime  clopidogrel Tablet 75 milliGRAM(s) Oral daily  losartan 100 milliGRAM(s) Oral daily  pantoprazole  Injectable 40 milliGRAM(s) IV Push daily    MEDICATIONS  (PRN):  acetaminophen   Tablet .. 650 milliGRAM(s) Oral every 6 hours PRN Mild Pain (1 - 3)      CAPILLARY BLOOD GLUCOSE        I&O's Summary    06 Jun 2019 07:01  -  07 Jun 2019 07:00  --------------------------------------------------------  IN: 240 mL / OUT: 0 mL / NET: 240 mL        PHYSICAL EXAM:  Vital Signs Last 24 Hrs  T(C): 36.7 (07 Jun 2019 04:04), Max: 36.9 (06 Jun 2019 16:45)  T(F): 98.1 (07 Jun 2019 04:04), Max: 98.4 (06 Jun 2019 16:45)  HR: 64 (07 Jun 2019 04:04) (64 - 85)  BP: 115/60 (07 Jun 2019 04:04) (111/70 - 135/67)  BP(mean): --  RR: 18 (07 Jun 2019 04:04) (18 - 18)  SpO2: 97% (07 Jun 2019 04:04) (95% - 98%)    GENERAL: NAD, well-developed  HEAD:  Atraumatic, Normocephalic  EYES: EOMI, PERRL, conjunctiva and sclera clear  NECK: Supple, No JVD  CHEST/LUNG: Clear to auscultation bilaterally; No wheeze  HEART: s1 s2 Regular rate and rhythm; No murmurs, rubs, or gallops  ABDOMEN: Soft, Nontender, Nondistended; Bowel sounds present  EXTREMITIES:  2+ Peripheral Pulses, No clubbing, cyanosis, or edema  NEUROLOGY: AAOx3; non-focal  SKIN: No rashes or lesions    LABS:                        13.7   8.8   )-----------( 215      ( 07 Jun 2019 06:30 )             41.5     06-07    140  |  102  |  20  ----------------------------<  108<H>  3.7   |  23  |  1.00    Ca    9.5      07 Jun 2019 06:30                RADIOLOGY & ADDITIONAL TESTS:    Imaging Personally Reviewed:    Consultant(s) Notes Reviewed:    cards/  Care Discussed with Consultants/Other Providers:  Np qiana

## 2019-06-07 NOTE — PROGRESS NOTE ADULT - PROBLEM SELECTOR PROBLEM 4
Hypertensive urgency
Bradycardia
Chest pain
Chest pain
HLD (hyperlipidemia)
Hypertensive urgency
Bradycardia

## 2019-06-07 NOTE — PROGRESS NOTE ADULT - ASSESSMENT
73 y/o F w/ PMHx of  recently diagnosed HTN( 5/2/19) and HLD. Admitted w/ Hypertensive urgency, after discontinuing her Metoprolol for 3-4 days and missing a dose of her Losartan-HCTz,  and neck pain radiating to bilateral shoulders). ( note that  patient admits to taking x1 dose of her Metoprolol and her losartan-HCTz, pill on day of presentation to ED, 6/1, in an attempt to decrease her BP after noting it to be very high).  Noted w/ two episodes of mobitz type 1 on telemetry 6/3/19; At time of 2nd episode patient w/ symptom of lightheadedness similar to lightheadedness experienced while taking metoprolol.       # Symptomatic Second degree AV block type 1   - Continue to hold off on all AVN blockers  - Staged PCI in progress;  s/p DESx2  to prox and mid LAD. For intervention to prox circ and  OM2  today. Patient will likely require BB therapy for management of CAD. EP will continue to follow for possible PPM implantation.   - EF 80-85%  -Continue telemetry monitoring      [id number] 73 y/o F w/ PMHx of  recently diagnosed HTN( 5/2/19) and HLD. Admitted w/ Hypertensive urgency, after discontinuing her Metoprolol for 3-4 days and missing a dose of her Losartan-HCTz,  and neck pain radiating to bilateral shoulders). ( note that  patient admits to taking x1 dose of her Metoprolol and her losartan-HCTz, pill on day of presentation to ED, 6/1, in an attempt to decrease her BP after noting it to be very high).  Noted w/ two episodes of mobitz type 1 on telemetry 6/3/19; At time of 2nd episode patient w/ symptom of lightheadedness similar to lightheadedness experienced while taking metoprolol.       # Symptomatic Second degree AV block type 1   - Continue to hold off on all AVN blockers  - Staged PCI in progress;  s/p DESx2  to prox and mid LAD. For intervention to prox circ and  OM2  today. Patient will likely require BB therapy for management of CAD. EP will continue to follow for possible PPM implantation.   - EF 80-85%  -Continue telemetry monitoring      [id number] 75 y/o F w/ PMHx of  recently diagnosed HTN( 5/2/19) and HLD. Admitted w/ Hypertensive urgency, after discontinuing her Metoprolol for 3-4 days and missing a dose of her Losartan-HCTz,  and neck pain radiating to bilateral shoulders). ( note that  patient admits to taking x1 dose of her Metoprolol and her losartan-HCTz, pill on day of presentation to ED, 6/1, in an attempt to decrease her BP after noting it to be very high).  Noted w/ two episodes of mobitz type 1 on telemetry 6/3/19; At time of 2nd episode patient w/ symptom of lightheadedness similar to lightheadedness experienced while taking metoprolol.       # Symptomatic Second degree AV block type 1   - Continue to hold off on all AVN blockers  - Staged PCI in progress;  s/p DESx2  to prox and mid LAD. For intervention to prox circ and  OM2  today.   - Continue to monitor off AVN blockers for now.   - EF 80-85%  -Continue telemetry monitoring      [id number]

## 2019-06-07 NOTE — DISCHARGE NOTE NURSING/CASE MANAGEMENT/SOCIAL WORK - NSDPLANG ASIS_GEN_ALL_CORE
No No abnormalities noted No abnormalities noted No abnormalities noted No abnormalities noted No abnormalities noted No abnormalities noted No abnormalities noted No abnormalities noted No abnormalities noted No abnormalities noted No abnormalities noted No abnormalities noted No abnormalities noted No abnormalities noted No abnormalities noted No abnormalities noted No abnormalities noted No abnormalities noted No abnormalities noted No abnormalities noted No abnormalities noted No abnormalities noted No abnormalities noted No abnormalities noted No abnormalities noted No abnormalities noted

## 2019-06-07 NOTE — PROGRESS NOTE ADULT - PROBLEM SELECTOR PLAN 1
s/p cardiac cath PCI to % x2 and staged today to prox and sital Cx.   aspirin, plavix
PCI to % x2 and staged tomorrow to prox and sital Cx.   aspirin, plavix
TTE for evaluation of LV function and r/o valvular abnormalities. Exercise nuclear stress test to r/o CAD, evaluate BP during exercise, on HTN meds. Patient experiences tightness in her neck and throat which radiates down her arms when climbing a flight of stairs.
zoltan type 1  monitor on tele  r2 pads at [facility] c/s
zoltan type 1  monitor on tele  r2 pads at bedside  appreciate ep c/s continue to monitor, hold avn blocking agents
zoltan type 1  monitor on tele  r2 pads at bedside  appreciate ep c/s continue to monitor, hold avn blocking agents
TTE shows hyperdynamic LV function. Hold any diuretic medication. Perform orthostatics. Administer 1 liter IV fluids. Exercise nuclear stress test to r/o CAD, evaluate BP during exercise. Patient experiences tightness in her neck and throat which radiates down her arms when climbing a flight of stairs.  K=3.2, replace and monitor.

## 2019-06-07 NOTE — PROGRESS NOTE ADULT - PROBLEM SELECTOR PROBLEM 2
Bradycardia
ANNA (acute kidney injury)
ANNA (acute kidney injury)
Bradycardia
Hypertension
Hypertensive urgency
Hypertension

## 2019-06-07 NOTE — PROGRESS NOTE ADULT - PROBLEM SELECTOR PROBLEM 1
CAD (coronary artery disease)
Bradycardia
CAD (coronary artery disease)
Chest pain
Chest pain

## 2019-06-07 NOTE — PROGRESS NOTE ADULT - PROBLEM SELECTOR PROBLEM 3
ANNA (acute kidney injury)
ANNA (acute kidney injury)
Chest pain
HLD (hyperlipidemia)
Hypertensive urgency
Hypertensive urgency
HLD (hyperlipidemia)

## 2019-06-08 ENCOUNTER — TRANSCRIPTION ENCOUNTER (OUTPATIENT)
Age: 74
End: 2019-06-08

## 2019-06-08 VITALS
OXYGEN SATURATION: 94 % | TEMPERATURE: 98 F | RESPIRATION RATE: 19 BRPM | DIASTOLIC BLOOD PRESSURE: 69 MMHG | HEART RATE: 80 BPM | SYSTOLIC BLOOD PRESSURE: 129 MMHG

## 2019-06-08 LAB
ALBUMIN SERPL ELPH-MCNC: 4.6 G/DL — SIGNIFICANT CHANGE UP (ref 3.3–5)
ALP SERPL-CCNC: 94 U/L — SIGNIFICANT CHANGE UP (ref 40–120)
ALT FLD-CCNC: 34 U/L — SIGNIFICANT CHANGE UP (ref 10–45)
ANION GAP SERPL CALC-SCNC: 16 MMOL/L — SIGNIFICANT CHANGE UP (ref 5–17)
AST SERPL-CCNC: 63 U/L — HIGH (ref 10–40)
BILIRUB SERPL-MCNC: 0.7 MG/DL — SIGNIFICANT CHANGE UP (ref 0.2–1.2)
BUN SERPL-MCNC: 17 MG/DL — SIGNIFICANT CHANGE UP (ref 7–23)
CALCIUM SERPL-MCNC: 9.6 MG/DL — SIGNIFICANT CHANGE UP (ref 8.4–10.5)
CHLORIDE SERPL-SCNC: 103 MMOL/L — SIGNIFICANT CHANGE UP (ref 96–108)
CO2 SERPL-SCNC: 21 MMOL/L — LOW (ref 22–31)
CREAT SERPL-MCNC: 0.81 MG/DL — SIGNIFICANT CHANGE UP (ref 0.5–1.3)
GLUCOSE SERPL-MCNC: 122 MG/DL — HIGH (ref 70–99)
HCT VFR BLD CALC: 44 % — SIGNIFICANT CHANGE UP (ref 34.5–45)
HGB BLD-MCNC: 15.1 G/DL — SIGNIFICANT CHANGE UP (ref 11.5–15.5)
MCHC RBC-ENTMCNC: 31.4 PG — SIGNIFICANT CHANGE UP (ref 27–34)
MCHC RBC-ENTMCNC: 34.4 GM/DL — SIGNIFICANT CHANGE UP (ref 32–36)
MCV RBC AUTO: 91.4 FL — SIGNIFICANT CHANGE UP (ref 80–100)
PLATELET # BLD AUTO: 170 K/UL — SIGNIFICANT CHANGE UP (ref 150–400)
POTASSIUM SERPL-MCNC: 5.4 MMOL/L — HIGH (ref 3.5–5.3)
POTASSIUM SERPL-SCNC: 5.4 MMOL/L — HIGH (ref 3.5–5.3)
PROT SERPL-MCNC: 8.4 G/DL — HIGH (ref 6–8.3)
RBC # BLD: 4.82 M/UL — SIGNIFICANT CHANGE UP (ref 3.8–5.2)
RBC # FLD: 12.3 % — SIGNIFICANT CHANGE UP (ref 10.3–14.5)
SODIUM SERPL-SCNC: 140 MMOL/L — SIGNIFICANT CHANGE UP (ref 135–145)
WBC # BLD: 9.3 K/UL — SIGNIFICANT CHANGE UP (ref 3.8–10.5)
WBC # FLD AUTO: 9.3 K/UL — SIGNIFICANT CHANGE UP (ref 3.8–10.5)

## 2019-06-08 PROCEDURE — 86803 HEPATITIS C AB TEST: CPT

## 2019-06-08 PROCEDURE — 80053 COMPREHEN METABOLIC PANEL: CPT

## 2019-06-08 PROCEDURE — 99232 SBSQ HOSP IP/OBS MODERATE 35: CPT

## 2019-06-08 PROCEDURE — C1760: CPT

## 2019-06-08 PROCEDURE — 83735 ASSAY OF MAGNESIUM: CPT

## 2019-06-08 PROCEDURE — C1769: CPT

## 2019-06-08 PROCEDURE — 99285 EMERGENCY DEPT VISIT HI MDM: CPT

## 2019-06-08 PROCEDURE — 80048 BASIC METABOLIC PNL TOTAL CA: CPT

## 2019-06-08 PROCEDURE — 99152 MOD SED SAME PHYS/QHP 5/>YRS: CPT

## 2019-06-08 PROCEDURE — A9500: CPT

## 2019-06-08 PROCEDURE — C9600: CPT | Mod: LC

## 2019-06-08 PROCEDURE — C1874: CPT

## 2019-06-08 PROCEDURE — 93005 ELECTROCARDIOGRAM TRACING: CPT

## 2019-06-08 PROCEDURE — 93017 CV STRESS TEST TRACING ONLY: CPT

## 2019-06-08 PROCEDURE — C1725: CPT

## 2019-06-08 PROCEDURE — 78452 HT MUSCLE IMAGE SPECT MULT: CPT

## 2019-06-08 PROCEDURE — 93306 TTE W/DOPPLER COMPLETE: CPT

## 2019-06-08 PROCEDURE — 93010 ELECTROCARDIOGRAM REPORT: CPT

## 2019-06-08 PROCEDURE — 99239 HOSP IP/OBS DSCHRG MGMT >30: CPT

## 2019-06-08 PROCEDURE — 99153 MOD SED SAME PHYS/QHP EA: CPT

## 2019-06-08 PROCEDURE — 82550 ASSAY OF CK (CPK): CPT

## 2019-06-08 PROCEDURE — 93454 CORONARY ARTERY ANGIO S&I: CPT | Mod: 59

## 2019-06-08 PROCEDURE — C1887: CPT

## 2019-06-08 PROCEDURE — 84484 ASSAY OF TROPONIN QUANT: CPT

## 2019-06-08 PROCEDURE — C1894: CPT

## 2019-06-08 PROCEDURE — 85027 COMPLETE CBC AUTOMATED: CPT

## 2019-06-08 RX ORDER — METOPROLOL TARTRATE 50 MG
1 TABLET ORAL
Qty: 0 | Refills: 0 | DISCHARGE

## 2019-06-08 RX ORDER — ATORVASTATIN CALCIUM 80 MG/1
1 TABLET, FILM COATED ORAL
Qty: 30 | Refills: 0
Start: 2019-06-08 | End: 2019-07-07

## 2019-06-08 RX ORDER — ACETAMINOPHEN 500 MG
2 TABLET ORAL
Qty: 0 | Refills: 0 | DISCHARGE
Start: 2019-06-08

## 2019-06-08 RX ORDER — ATORVASTATIN CALCIUM 80 MG/1
1 TABLET, FILM COATED ORAL
Qty: 0 | Refills: 0 | DISCHARGE

## 2019-06-08 RX ORDER — CLOPIDOGREL BISULFATE 75 MG/1
1 TABLET, FILM COATED ORAL
Qty: 30 | Refills: 0
Start: 2019-06-08 | End: 2019-07-07

## 2019-06-08 RX ADMIN — CLOPIDOGREL BISULFATE 75 MILLIGRAM(S): 75 TABLET, FILM COATED ORAL at 11:02

## 2019-06-08 RX ADMIN — LOSARTAN POTASSIUM 100 MILLIGRAM(S): 100 TABLET, FILM COATED ORAL at 05:25

## 2019-06-08 RX ADMIN — Medication 81 MILLIGRAM(S): at 11:02

## 2019-06-08 RX ADMIN — PANTOPRAZOLE SODIUM 40 MILLIGRAM(S): 20 TABLET, DELAYED RELEASE ORAL at 11:02

## 2019-06-08 NOTE — PROGRESS NOTE ADULT - SUBJECTIVE AND OBJECTIVE BOX
SUBJ:    MEDICATIONS  (STANDING):  aspirin  chewable 81 milliGRAM(s) Oral daily  atorvastatin 40 milliGRAM(s) Oral at bedtime  clopidogrel Tablet 75 milliGRAM(s) Oral daily  losartan 100 milliGRAM(s) Oral daily  pantoprazole  Injectable 40 milliGRAM(s) IV Push daily    MEDICATIONS  (PRN):  acetaminophen   Tablet .. 650 milliGRAM(s) Oral every 6 hours PRN Mild Pain (1 - 3)      Vital Signs Last 24 Hrs  T(C): 36.7 (08 Jun 2019 04:19), Max: 36.8 (07 Jun 2019 20:47)  T(F): 98.1 (08 Jun 2019 04:19), Max: 98.3 (07 Jun 2019 20:47)  HR: 67 (08 Jun 2019 04:19) (61 - 75)  BP: 125/71 (08 Jun 2019 04:19) (121/76 - 151/83)  BP(mean): --  RR: 18 (08 Jun 2019 04:19) (17 - 18)  SpO2: 95% (08 Jun 2019 04:19) (93% - 100%)    REVIEW OF SYSTEMS:  CONSTITUTIONAL: No fever, weight loss, or fatigue  EYES: No eye pain, visual disturbances, or discharge  ENMT:  No difficulty hearing, tinnitus, vertigo; No sinus or throat pain  NECK: No pain or stiffness  RESPIRATORY: No cough, wheezing, chills or hemoptysis; No shortness of breath  CARDIOVASCULAR: No chest pain, palpitations, dizziness, or leg swelling  GASTROINTESTINAL: No abdominal or epigastric pain. No nausea, vomiting, or hematemesis; No diarrhea or constipation. No melena or hematochezia.  GENITOURINARY: No dysuria, frequency, hematuria, or incontinence  NEUROLOGICAL: No headaches, memory loss, loss of strength, numbness, or tremors  SKIN: No itching, burning, rashes, or lesions   LYMPH NODES: No enlarged glands  ENDOCRINE: No heat or cold intolerance; No hair loss  MUSCULOSKELETAL: No joint pain or swelling; No muscle, back, or extremity pain  PSYCHIATRIC: No depression, anxiety, mood swings, or difficulty sleeping  HEME/LYMPH: No easy bruising, or bleeding gums  ALLERY AND IMMUNOLOGIC: No hives or eczema    PHYSICAL EXAM:  · CONSTITUTIONAL: Well-developed, well nourished      · EYES: EOMI; PERRL; no drainage or redness  · NECK: No bruits; no thyromegaly or nodules  ·RESPIRATORY:   airway patent; breath sounds equal; good air movement; respirations non-labored; clear to auscultation bilaterally; no chest wall tenderness; no intercostal retractions; no rales,rhonchi or wheeze  · CARDIOVASCULAR: regular rate and rhythm  no rub  no murmur  normal PMI  . GASTROINTESTINAL:  no distention; no masses palpable; bowel sounds normal  · EXTREMITIES: No cyanosis, clubbing or edema  · VASCULAR:  Equal and normal pulses (radial, femoral)  ·NEUROLOGICAL:  Alert and oriented x 3; sensation intact; deep reflexes intact; cranial nerves intact; no spontaneous movement; superficial reflexes intact; normal strength  · SKIN: No lesions; no rash  . LYMPH NODES: No lymphadedenopathy  · MUSCULOSKELETAL:  No calf tenderness  no joint swelling	    TELEMETRY:    ECG:    TTE:    LABS:   CBC Full  -  ( 07 Jun 2019 06:30 )  WBC Count : 8.8 K/uL  RBC Count : 4.57 M/uL  Hemoglobin : 13.7 g/dL  Hematocrit : 41.5 %  Platelet Count - Automated : 215 K/uL  Mean Cell Volume : 90.9 fl  Mean Cell Hemoglobin : 29.9 pg  Mean Cell Hemoglobin Concentration : 32.9 gm/dL  Auto Neutrophil # : x  Auto Lymphocyte # : x  Auto Monocyte # : x  Auto Eosinophil # : x  Auto Basophil # : x  Auto Neutrophil % : x  Auto Lymphocyte % : x  Auto Monocyte % : x  Auto Eosinophil % : x  Auto Basophil % : x    06-07    140  |  102  |  20  ----------------------------<  108<H>  3.7   |  23  |  1.00    Ca    9.5      07 Jun 2019 06:30            RADIOLOGY & ADDITIONAL STUDIES:    IMPRESSION AND PLAN:        Florence Henriquez MD, MPH, ITZ  Cardiovascular Specialist Attending  [facility]  C: [phone]  E: [email or web address]  (Cardiology nocturnist available every night 7 pm - 7 am; available week days for follow-up; general cardiology consult coverage weekend days) SUBJ:  No acute events overnight. CAD s/p staged PCI to the LAD and LCx. Telemetry with intermittent second degree AV block in the setting of beta-blockade and severe non-revascularized CAD.     MEDICATIONS  (STANDING):  aspirin  chewable 81 milliGRAM(s) Oral daily  atorvastatin 40 milliGRAM(s) Oral at bedtime  clopidogrel Tablet 75 milliGRAM(s) Oral daily  losartan 100 milliGRAM(s) Oral daily  pantoprazole  Injectable 40 milliGRAM(s) IV Push daily    MEDICATIONS  (PRN):  acetaminophen   Tablet 650 milliGRAM(s) Oral every 6 hours PRN Mild Pain (1 - 3)    Vital Signs Last 24 Hrs  T(C): 36.7 (08 Jun 2019 04:19), Max: 36.8 (07 Jun 2019 20:47)  T(F): 98.1 (08 Jun 2019 04:19), Max: 98.3 (07 Jun 2019 20:47)  HR: 67 (08 Jun 2019 04:19) (61 - 75)  BP: 125/71 (08 Jun 2019 04:19) (121/76 - 151/83)  RR: 18 (08 Jun 2019 04:19) (17 - 18)  SpO2: 95% (08 Jun 2019 04:19) (93% - 100%)    REVIEW OF SYSTEMS:  As per HPI, otherwise unremarkable.     PHYSICAL EXAM:  Appearance: Normal	  HEENT:   Normal oral mucosa  Lymphatic: No lymphadenopathy  Cardiovascular: Normal S1 S2, No edema  Respiratory: Lungs clear to auscultation	  Psychiatry: A & O x 3  Gastrointestinal:  Soft, Non-tender  Skin: No rashes, No ecchymoses	  Neurologic: Non-focal  Extremities: No clubbing, cyanosis or edema  Vascular: Peripheral pulses palpable 2+ bilaterally    TTE: 6/3/2019  Conclusions:  1. Normal mitral valve. Minimal mitral regurgitation.  2. Aortic valve not well visualized; probably normal. No  aortic valve regurgitation seen.  3. Hyperdynamic left ventricular systolic function.  4. Normal right ventricular size and function.  5. Thickened pericardium. Trace pericardial effusion seen.  *** No previous Echo exam.    MPI Pharm 6/5/2019  IMPRESSIONS: Abnormal Study  * Chest Pain: Patient developed neck tightness at 2:00 min  following regadenoson infusion at a HR of 104 bpm and  chest heaviness at 4:00 min following infusion at 98 bpm  which resolved by 7:40 minutes of recovery following  administration of aminophylline.  * Symptom: Neck and chest discomfort during infusion  resolved after administration of Aminophylline during  prolonged recovery.  * HR Response: Appropriate.  * BP Response: Appropriate.  * Heart Rhythm: Sinus Rhythm - 65 BPM.  * Conduction defects: Early repolarization.  * Q Waves: III and aVF.  * Baseline ECG: Nonspecific ST-T wave abnormality.  * ECG Changes: ST Depression: 1 mm downsloping in leads  II, III, aVF, and V3-V6 started at 2:00 min following  regadenoson infusion at 104 bpm and persisted at least  10:55 min in recovery.  * Arrhythmia: A single VPD occurred during recovery.  * The left ventricle was small in size. There are large,  moderate to severe defects in the anteroseptal, mid to  distal anterior, distal anterolateral, and apical walls  that are mostly reversible suggestive of infarct with  significant tomi-infarct ischemia.  * Post-stress gated wall motion analysis was performed  (LVEF > 70%;LVEDV = 29 ml.) revealing hypokinesis of the  mid to distal anterior, mid to distal anteroseptal, distal  anterolateral, and apical walls.  *** No previous Nuclear/Stress exam.    Cardiac Cath 6/6/2019  PROCEDURE:  --  Left coronary angiography.  --  Right coronary angiography.  --  Intervention on proximal LAD: drug-eluting stent.  --  Intervention on mid LAD: drug-eluting stent.  CORONARY VESSELS: The coronary circulation is right dominant.  LM:   --  LM: Normal.  LAD:   --  Proximal LAD: There was a 100 % stenosis.  --  Mid LAD: There was a 80 % stenosis.  CX:   --  Proximal circumflex: There was a 70 % stenosis.  --  OM2: There was a 90 % stenosis.  RCA:   --  Proximal RCA: There was a 50 % stenosis.    COMPLICATIONS: There were no complications.    INTERVENTIONAL RECOMMENDATIONS: ASA and Plavix for 3 months    Cardiac Cath 6/7/2019  PROCEDURE:  --  Hemostasis with Perclose-Intervention.  --  Intervention on proximal circumflex: drug-eluting stent.    CORONARY VESSELS:  CX:   --  Proximal circumflex: There was a 70 % stenosis.  COMPLICATIONS: There were no complications.  INTERVENTIONAL RECOMMENDATIONS: DAPT for 1 year    LABS:   CBC Full  -  ( 07 Jun 2019 06:30 )  WBC Count : 8.8 K/uL  RBC Count : 4.57 M/uL  Hemoglobin : 13.7 g/dL  Hematocrit : 41.5 %  Platelet Count - Automated : 215 K/uL  Mean Cell Volume : 90.9 fl  Mean Cell Hemoglobin : 29.9 pg  Mean Cell Hemoglobin Concentration : 32.9 gm/dL    06-07    140  |  102  |  20  ----------------------------<  108<H>  3.7   |  23  |  1.00    Ca    9.5      07 Jun 2019 06:30    IMPRESSION AND PLAN:  74y Female patient with past medical history of HTN, HLD presenting with hypertensive emergency and type II NSTEMI.     1) Type II NSTEMI   Secondary to uncontrolled HTN; symptoms resolve with improved BP.   CAD risk factors: HTN, HLD, age, family history   ECHO with preserved EF and no WMA.   Pharm MPI with obstructive LAD CAD  hs-troponin peak 28  s/p PCI LIZET LAD and LCx.    ·	Continue medical management with aspirin 81 mg daily, clopidogrel 75 mg daily, losartan 100 mg daily.   ·	Increase atorvastatin to 80 mg nightly.     2) HTN   Well controlled when on home medications.     ·	Continue losartan 100 mg daily, HCTZ 12.5-25 mg daily.   ·	Discontinue beta-blockers due to bradycardia, second degree type 2 AV block. (resolved)     3) HLD   CAD diagnosed; statin benefit group.     ·	Continue medical management with increased dose atorvastatin 80 mg nightly.     4) Second degree type 2, transient, resolved off beta blockade   Setting of beta blockade, severe CAD, typically occurring while sleeping  s/p revascularization   I do not believe she is symptomatic specifically from this brief   Hemodynamically stable.     ·	Discontinued metoprolol with no plans to resume.   ·	I am not in favor in proceeding with a PPM; outpatient follow up.     Acceptable for discharge today.     Florence Henriquez MD, MPH, ITZ  Cardiovascular Specialist Attending  [facility]  C: [phone]  E: [email or web address]  (Cardiology nocturnist available every night 7 pm - 7 am; available week days for follow-up; general cardiology consult coverage weekend days)

## 2019-06-08 NOTE — DISCHARGE NOTE PROVIDER - PROVIDER TOKENS
FREE:[LAST:[Dr. Gerardo Pena],FIRST:[Primary Care Doctor],PHONE:[(   )    -],FAX:[(   )    -],FOLLOWUP:[1 week]],PROVIDER:[TOKEN:[[id number]],FOLLOWUP:[1 week]]

## 2019-06-08 NOTE — DISCHARGE NOTE PROVIDER - NSDCFUADDAPPT_GEN_ALL_CORE_FT
Follow up with your Cardiologist within 1 week.  Follow up with your Primary Care Doctor within 1 week.

## 2019-06-08 NOTE — CHART NOTE - NSCHARTNOTEFT_GEN_A_CORE
Spoke with Soheila - patient can be discharged off the BB with General Cardiology follow-up.  Patient is being discharged home with homecare.    Magda Anders NP  [phone]

## 2019-06-08 NOTE — DISCHARGE NOTE PROVIDER - NSDCCPCAREPLAN_GEN_ALL_CORE_FT
PRINCIPAL DISCHARGE DIAGNOSIS  Diagnosis: CAD (coronary artery disease)  Assessment and Plan of Treatment: Coronary artery disease is a condition where the arteries the supply the heart muscle get clogged with fatty deposits & puts you at risk for a heart attack.  Call your doctor if you have any new pain, pressure, or discomfort in the center of your chest, pain, tingling or discomfort in arms, back, neck, jaw, or stomach, shortness of breath, nausea, vomiting, burping or heartburn, sweating, cold and clammy skin, racing or abnormal heartbeat for more than 10 minutes or if they keep coming & going.  Call 911 and do not try to get to hospital by car.  You can help yourself with lifestyle changes (quitting smoking if you smoke), eat lots of fruits & vegetables & low fat dairy products, not a lot of meat & fatty foods, walk or some form of physical activity most days of the week, lose weight if you are overweight.  Take your cardiac medication as prescribed to lower cholesterol, to lower blood pressure, and control your blood sugar.      SECONDARY DISCHARGE DIAGNOSES  Diagnosis: Mobitz type 1 second degree AV block  Assessment and Plan of Treatment: Resolved.  Do not resume taking beta blockers, such as metoprolol.  Follow up with your General Cardiologist within 1 week.    Diagnosis: Hypertensive urgency  Assessment and Plan of Treatment: Continue to follow a low salt/sodium diet.  Perform physical activities as tolerated in consultation with your Primary Care Provider and physical therapist.  Take all medications as prescribed.  Follow up with your medical doctor for routine blood pressure monitoring at your next visit.  Notify your doctor if you have any of the following symptoms:  Dizziness, lightheadedness, blurry vision, headache, chest pain, or shortness of breath.

## 2019-06-08 NOTE — DISCHARGE NOTE PROVIDER - HOSPITAL COURSE
74 year-old female with PMH significant for recently diagnosed HTN and HLD, presented to the hospital with high blood pressure, admitted for hypertensive urgency and atypical chest pain, patient admitted for type II NSTEMI.  Patient noted to have two episodes of mobitz type 1 on telemetry – reported lightheadedness with 2nd episode.  BB was discontinued – no further episodes were noted.  Echocardiograms with normal mitral valve, minimal mitral regurgitation, hyperdynamic left ventricular systolic function, and normal right ventricular size and function, and thickened pericardium.  Patient had an abnormal stress test – she is now s/p DESx2 to proximal and mid LAD, and staged staged PCI/LIZET to LCx.  Patient has been cleared by Cardiology, EP, and IM for discharge – remains on medical management with aspirin, clopidogrel, atorvastatin, and HCTZ – will remain OFF beta blockers.  Patient will follow up with General Cardiology and Primary Care within 1 wee and is being discharged home with homecare.

## 2019-06-08 NOTE — DISCHARGE NOTE PROVIDER - NSDCCAREPROVSEEN_GEN_ALL_CORE_FT
[facility] Cardiac Electrophysiology, Team  [facility] Cardiology, CATH Service  [facility] Medicine, Advance PracticeTeam  Cha Patel

## 2019-06-08 NOTE — DISCHARGE NOTE PROVIDER - CARE PROVIDER_API CALL
Dr. Gerardo Pena, Primary Care Doctor  Phone: (   )    -  Fax: (   )    -  Follow Up Time: 1 week    Florence Henriquez (MD)  Internal Medicine  [address] [place] [address], [address] [place] [address]  Phone: [phone]  Fax: [phone]  Follow Up Time: 1 week

## 2019-06-08 NOTE — PROGRESS NOTE ADULT - PROVIDER SPECIALTY LIST ADULT
Cardiology
Electrophysiology
Hospitalist
Electrophysiology
Cardiology
Hospitalist

## 2019-06-08 NOTE — CHART NOTE - NSCHARTNOTEFT_GEN_A_CORE
Patient is s/p  Intervention on proximal circumflex: drug-eluting stent on 6/7 via RFA Hemostasis with Perclose-Intervention.  Right groin benign without bleeding or hematoma, soft, non tender, +2 pedal and femoral pulses   patient ambulating well, patient verbalizes to "feel much better"   patient denies chest pain, dyspnea, dizziness, palpitations, N&V, HA.   cont DAPT for 1 year, teaching done about medication compliance and site management as well as possible complications   patient verbalizes understanding and gives positive feedback    SR on tele with no events   post procedural EKG unchanged  vs stable as per flow-sheet     creat 0.81             15.1   9.3   )-----------( 170      ( 08 Jun 2019 11:16 )             44.0         CORONARY VESSELS:  CX:   --  Proximal circumflex: There was a 70 % stenosis.  COMPLICATIONS: There were no complications.  INTERVENTIONAL RECOMMENDATIONS: DAPT for 1 year  Prepared and signed by  Curly Palencia M.D.    < end of copied text >        all other care as per primary team Patient is s/p  Intervention on proximal circumflex: drug-eluting stent on 6/7 via RFA Hemostasis with Perclose-Intervention.  Right groin benign without bleeding or hematoma, soft, non tender, +2 pedal and femoral pulses   patient ambulating well, patient verbalizes to "feel much better"   patient denies chest pain, dyspnea, dizziness, palpitations, N&V, HA.   cont DAPT for 1 year, teaching done about medication compliance and site management as well as possible complications   patient verbalizes understanding and gives positive feedback    SR on tele with no events   post procedural EKG unchanged  vs stable as per flow-sheet       06-08    140  |  103  |  17  ----------------------------<  122<H>  5.4<H>   |  21<L>  |  0.81    Ca    9.6      08 Jun 2019 11:16    TPro  8.4<H>  /  Alb  4.6  /  TBili  0.7  /  DBili  x   /  AST  63<H>  /  ALT  34  /  AlkPhos  94  06-08               15.1   9.3   )-----------( 170      ( 08 Jun 2019 11:16 )             44.0         CORONARY VESSELS:  CX:   --  Proximal circumflex: There was a 70 % stenosis.  COMPLICATIONS: There were no complications.  INTERVENTIONAL RECOMMENDATIONS: DAPT for 1 year  Prepared and signed by  Curly Palencia M.D.    < end of copied text >        all other care as per primary team

## 2019-06-08 NOTE — DISCHARGE NOTE PROVIDER - NSDCPNSUBOBJ_GEN_ALL_CORE
Anmol Terrazas M.D. Pager Number [phone]        Patient is a 74y old  Female who presents with a chief complaint of NSTEMI (08 Jun 2019 12:30)            SUBJECTIVE / OVERNIGHT EVENTS:    Pt seen and examined at bedside. No acute events overnight.    Pt denies cp, palpitations, sob, abd pain, N/V, fever, chills.         ROS:    All other review of systems negative        Allergies        No Known Allergies        Intolerances                MEDICATIONS  (STANDING):    aspirin  chewable 81 milliGRAM(s) Oral daily    atorvastatin 40 milliGRAM(s) Oral at bedtime    clopidogrel Tablet 75 milliGRAM(s) Oral daily    losartan 100 milliGRAM(s) Oral daily    pantoprazole  Injectable 40 milliGRAM(s) IV Push daily        MEDICATIONS  (PRN):    acetaminophen   Tablet .. 650 milliGRAM(s) Oral every 6 hours PRN Mild Pain (1 - 3)            Vital Signs Last 24 Hrs    T(C): 36.7 (08 Jun 2019 12:11), Max: 36.8 (07 Jun 2019 20:47)    T(F): 98.1 (08 Jun 2019 12:11), Max: 98.3 (07 Jun 2019 20:47)    HR: 80 (08 Jun 2019 12:11) (61 - 80)    BP: 129/69 (08 Jun 2019 12:11) (125/71 - 151/83)    BP(mean): --    RR: 19 (08 Jun 2019 12:11) (17 - 19)    SpO2: 94% (08 Jun 2019 12:11) (93% - 100%)    CAPILLARY BLOOD GLUCOSE                I&O's Summary        07 Jun 2019 07:01  -  08 Jun 2019 07:00    --------------------------------------------------------    IN: 640 mL / OUT: 0 mL / NET: 640 mL        08 Jun 2019 07:01  -  08 Jun 2019 14:51    --------------------------------------------------------    IN: 720 mL / OUT: 0 mL / NET: 720 mL                PHYSICAL EXAM:    GENERAL: NAD, well-developed    HEAD:  Atraumatic, Normocephalic    EYES: EOMI, PERRLA, conjunctiva and sclera clear    NECK: Supple, No JVD    CHEST/LUNG: Clear to auscultation bilaterally; No wheeze    HEART: Regular rate and rhythm; No murmurs, rubs, or gallops    ABDOMEN: Soft, Nontender, Nondistended; Bowel sounds present    EXTREMITIES:  2+ Peripheral Pulses, No clubbing, cyanosis, or edema. R wrist ecchymosis s/p PCI site. Right groin PCI site c/d/i without underlying hematoma    NEUROLOGY: AAOx3, non-focal    PSYCH: calm    SKIN: No rashes or lesions        LABS:                            15.1     9.3   )-----------( 170      ( 08 Jun 2019 11:16 )               44.0         06-08        140  |  103  |  17    ----------------------------<  122<H>    5.4<H>   |  21<L>  |  0.81        Ca    9.6      08 Jun 2019 11:16        TPro  8.4<H>  /  Alb  4.6  /  TBili  0.7  /  DBili  x   /  AST  63<H>  /  ALT  34  /  AlkPhos  94  06-08        Assessment and Plan:     · Assessment        75 y/o F with a PMH significant for recently diagnosed HTN and HLD who presents to the hospital with high blood pressure, admitted for hypertensive urgency and atypical cp         Problem/Plan - 1:    ·  Problem: CAD (coronary artery disease).  Plan: s/p cardiac cath PCI to % x2 and staged to prox and sital Cx.     Cardiology recs appreciated; Continue with ASA/Plavix/Statin    Discontinue BBlocker due to Bradycardia    Follow up as outpatient         Problem/Plan - 2:    ·  Problem: Bradycardia.  Plan: mobitz type 1    Asymptomatic    Cardiology recs appreciated; Do not recommend PPM. EP recs to follow up as outpatient    Avoid AV claire Blockers, ie BBlockers          Problem/Plan - 3:    ·  Problem: ANNA (acute kidney injury).  Plan: on ckd stage 2    Improved    Monitor outpatient         Problem/Plan - 4:    ·  Problem: Hypertensive urgency.  Plan: - Resolved    -Continue Losartan/HCTZ 100-12.5mg daily         Problem/Plan - 5:    ·  Problem: Chest pain.  Plan: Resolved    -Management as above.          Problem/Plan - 6:    Problem: HLD (hyperlipidemia). Plan: - stable    - continue atorvastatin 80mg daily.        35mins spent discussing discharge instructions

## 2019-06-11 PROBLEM — E78.5 HYPERLIPIDEMIA, UNSPECIFIED: Chronic | Status: ACTIVE | Noted: 2019-06-02

## 2019-06-11 PROBLEM — I10 ESSENTIAL (PRIMARY) HYPERTENSION: Chronic | Status: ACTIVE | Noted: 2019-06-01

## 2019-06-12 PROBLEM — Z00.00 ENCOUNTER FOR PREVENTIVE HEALTH EXAMINATION: Status: ACTIVE | Noted: 2019-06-12

## 2019-06-25 PROBLEM — Z86.39 HISTORY OF HYPERLIPIDEMIA: Status: RESOLVED | Noted: 2019-06-25 | Resolved: 2019-06-25

## 2019-06-26 ENCOUNTER — NON-APPOINTMENT (OUTPATIENT)
Age: 74
End: 2019-06-26

## 2019-06-26 ENCOUNTER — APPOINTMENT (OUTPATIENT)
Dept: CARDIOLOGY | Facility: CLINIC | Age: 74
End: 2019-06-26
Payer: MEDICARE

## 2019-06-26 VITALS
DIASTOLIC BLOOD PRESSURE: 80 MMHG | WEIGHT: 126 LBS | SYSTOLIC BLOOD PRESSURE: 130 MMHG | HEIGHT: 62 IN | BODY MASS INDEX: 23.19 KG/M2

## 2019-06-26 DIAGNOSIS — Z78.9 OTHER SPECIFIED HEALTH STATUS: ICD-10-CM

## 2019-06-26 DIAGNOSIS — I10 ESSENTIAL (PRIMARY) HYPERTENSION: ICD-10-CM

## 2019-06-26 DIAGNOSIS — Z86.39 PERSONAL HISTORY OF OTHER ENDOCRINE, NUTRITIONAL AND METABOLIC DISEASE: ICD-10-CM

## 2019-06-26 DIAGNOSIS — Z82.49 FAMILY HISTORY OF ISCHEMIC HEART DISEASE AND OTHER DISEASES OF THE CIRCULATORY SYSTEM: ICD-10-CM

## 2019-06-26 PROCEDURE — 99215 OFFICE O/P EST HI 40 MIN: CPT | Mod: 25

## 2019-06-26 PROCEDURE — G0447 BEHAVIOR COUNSEL OBESITY 15M: CPT

## 2019-06-26 PROCEDURE — 93000 ELECTROCARDIOGRAM COMPLETE: CPT | Mod: 59

## 2019-06-26 RX ORDER — MULTIVIT-MIN/IRON FUM/FOLIC AC 7.5 MG-4
TABLET ORAL
Refills: 0 | Status: ACTIVE | COMMUNITY

## 2019-06-26 RX ORDER — B-COMPLEX WITH VITAMIN C
TABLET ORAL
Refills: 0 | Status: ACTIVE | COMMUNITY

## 2019-07-13 ENCOUNTER — EMERGENCY (EMERGENCY)
Facility: HOSPITAL | Age: 74
LOS: 1 days | Discharge: HOME | End: 2019-07-13
Attending: EMERGENCY MEDICINE
Payer: MEDICARE

## 2019-07-13 VITALS
HEART RATE: 65 BPM | WEIGHT: 126.1 LBS | SYSTOLIC BLOOD PRESSURE: 191 MMHG | TEMPERATURE: 98 F | RESPIRATION RATE: 20 BRPM | DIASTOLIC BLOOD PRESSURE: 99 MMHG | OXYGEN SATURATION: 98 %

## 2019-07-13 DIAGNOSIS — Z98.890 OTHER SPECIFIED POSTPROCEDURAL STATES: Chronic | ICD-10-CM

## 2019-07-13 PROCEDURE — 99285 EMERGENCY DEPT VISIT HI MDM: CPT | Mod: 25

## 2019-07-13 PROCEDURE — 93010 ELECTROCARDIOGRAM REPORT: CPT

## 2019-07-13 NOTE — ED ADULT NURSE NOTE - OBJECTIVE STATEMENT
73 y/o female with PMH CAD s/p stent x 3 in May 2019, HTN, HCL arrives to ED with c/o high blood pressure. Patient states she regularly takes blood pressure at home, starting today it was found to be 180/90. Patient states she took a nap later in the day and rechecked blood pressure, still elevated and experiencing lightheadedness. Patient took second dose of quinapril (only takes once daily). Patient is a/ox4, denies headache, shortness of breath, chest pain, abdomen pain, n/v/d. No urinary symptoms. States she used to take Metoprolol but her pcp d/c it because she was feeling dizzy and weak when she would take it. Patient well appearing, only c/o is lightheaded with ambulation. IV20g placed to right AC, labs sent as ordered. Patient hypertensive in ED as documented.

## 2019-07-13 NOTE — ED ADULT NURSE NOTE - NSIMPLEMENTINTERV_GEN_ALL_ED
Implemented All Universal Safety Interventions:  [place] to call system. Call bell, personal items and telephone within reach. Instruct patient to call for assistance. Room bathroom lighting operational. Non-slip footwear when patient is off stretcher. Physically safe environment: no spills, clutter or unnecessary equipment. Stretcher in lowest position, wheels locked, appropriate side rails in place.

## 2019-07-14 VITALS
HEART RATE: 68 BPM | SYSTOLIC BLOOD PRESSURE: 137 MMHG | DIASTOLIC BLOOD PRESSURE: 78 MMHG | RESPIRATION RATE: 20 BRPM | OXYGEN SATURATION: 97 % | TEMPERATURE: 98 F

## 2019-07-14 LAB
ALBUMIN SERPL ELPH-MCNC: 4.7 G/DL — SIGNIFICANT CHANGE UP (ref 3.3–5)
ALP SERPL-CCNC: 178 U/L — HIGH (ref 40–120)
ALT FLD-CCNC: 184 U/L — HIGH (ref 10–45)
ANION GAP SERPL CALC-SCNC: 14 MMOL/L — SIGNIFICANT CHANGE UP (ref 5–17)
APTT BLD: 37.5 SEC — HIGH (ref 27.5–36.3)
AST SERPL-CCNC: 143 U/L — HIGH (ref 10–40)
BILIRUB SERPL-MCNC: 0.8 MG/DL — SIGNIFICANT CHANGE UP (ref 0.2–1.2)
BUN SERPL-MCNC: 10 MG/DL — SIGNIFICANT CHANGE UP (ref 7–23)
CALCIUM SERPL-MCNC: 9.5 MG/DL — SIGNIFICANT CHANGE UP (ref 8.4–10.5)
CHLORIDE SERPL-SCNC: 103 MMOL/L — SIGNIFICANT CHANGE UP (ref 96–108)
CO2 SERPL-SCNC: 24 MMOL/L — SIGNIFICANT CHANGE UP (ref 22–31)
CREAT SERPL-MCNC: 0.71 MG/DL — SIGNIFICANT CHANGE UP (ref 0.5–1.3)
GLUCOSE SERPL-MCNC: 110 MG/DL — HIGH (ref 70–99)
HCT VFR BLD CALC: 42.4 % — SIGNIFICANT CHANGE UP (ref 34.5–45)
HGB BLD-MCNC: 14.4 G/DL — SIGNIFICANT CHANGE UP (ref 11.5–15.5)
INR BLD: 1.03 RATIO — SIGNIFICANT CHANGE UP (ref 0.88–1.16)
MAGNESIUM SERPL-MCNC: 2.2 MG/DL — SIGNIFICANT CHANGE UP (ref 1.6–2.6)
MCHC RBC-ENTMCNC: 31.6 PG — SIGNIFICANT CHANGE UP (ref 27–34)
MCHC RBC-ENTMCNC: 33.9 GM/DL — SIGNIFICANT CHANGE UP (ref 32–36)
MCV RBC AUTO: 93.1 FL — SIGNIFICANT CHANGE UP (ref 80–100)
PLATELET # BLD AUTO: 264 K/UL — SIGNIFICANT CHANGE UP (ref 150–400)
POTASSIUM SERPL-MCNC: 4.1 MMOL/L — SIGNIFICANT CHANGE UP (ref 3.5–5.3)
POTASSIUM SERPL-SCNC: 4.1 MMOL/L — SIGNIFICANT CHANGE UP (ref 3.5–5.3)
PROT SERPL-MCNC: 8.2 G/DL — SIGNIFICANT CHANGE UP (ref 6–8.3)
PROTHROM AB SERPL-ACNC: 11.8 SEC — SIGNIFICANT CHANGE UP (ref 10–12.9)
RBC # BLD: 4.56 M/UL — SIGNIFICANT CHANGE UP (ref 3.8–5.2)
RBC # FLD: 12.8 % — SIGNIFICANT CHANGE UP (ref 10.3–14.5)
SODIUM SERPL-SCNC: 141 MMOL/L — SIGNIFICANT CHANGE UP (ref 135–145)
TROPONIN T, HIGH SENSITIVITY RESULT: <6 NG/L — SIGNIFICANT CHANGE UP (ref 0–51)
WBC # BLD: 8.2 K/UL — SIGNIFICANT CHANGE UP (ref 3.8–10.5)
WBC # FLD AUTO: 8.2 K/UL — SIGNIFICANT CHANGE UP (ref 3.8–10.5)

## 2019-07-14 PROCEDURE — 84484 ASSAY OF TROPONIN QUANT: CPT

## 2019-07-14 PROCEDURE — 85730 THROMBOPLASTIN TIME PARTIAL: CPT

## 2019-07-14 PROCEDURE — 83735 ASSAY OF MAGNESIUM: CPT

## 2019-07-14 PROCEDURE — 85027 COMPLETE CBC AUTOMATED: CPT

## 2019-07-14 PROCEDURE — 85610 PROTHROMBIN TIME: CPT

## 2019-07-14 PROCEDURE — 36415 COLL VENOUS BLD VENIPUNCTURE: CPT

## 2019-07-14 PROCEDURE — 71046 X-RAY EXAM CHEST 2 VIEWS: CPT | Mod: 26

## 2019-07-14 PROCEDURE — 80053 COMPREHEN METABOLIC PANEL: CPT

## 2019-07-14 PROCEDURE — 93005 ELECTROCARDIOGRAM TRACING: CPT

## 2019-07-14 PROCEDURE — 99284 EMERGENCY DEPT VISIT MOD MDM: CPT | Mod: 25

## 2019-07-14 PROCEDURE — 71046 X-RAY EXAM CHEST 2 VIEWS: CPT

## 2019-07-14 NOTE — ED PROVIDER NOTE - NSFOLLOWUPINSTRUCTIONS_ED_ALL_ED_FT
Please follow-up with your primary care doctor in the next 24-48 hours to let them know you were seen in the emergency department and to follow-up on elevated liver enzymes     If you have any worsening symptoms, severe headache, changes in vision, chest pain, difficulty breathing, please return to the emergency department

## 2019-07-14 NOTE — ED PROVIDER NOTE - OBJECTIVE STATEMENT
51F, pmh of CAD (2 stents), HTN, HLD presenting with hypertension. patient reports increasing blood pressure throughout the day with systolic rising from 150s -190s. reports lightheadedness. denies any chest pain, difficulty breathing, abdominal pain, nausea, vomiting, pain or burning with urination.

## 2019-07-14 NOTE — ED PROVIDER NOTE - PROGRESS NOTE DETAILS
updated patient on results. reports symptoms improved. no abdominal pain on rexam. will discharge with pcp follow-up. - resident Gustavo Bryant

## 2019-07-14 NOTE — ED PROVIDER NOTE - CLINICAL SUMMARY MEDICAL DECISION MAKING FREE TEXT BOX
74F presenting with high blood pressure. no chest pain. presentation is similar to previous visit when she was admitted for nstemi so will do cardiac workup. 74F presenting with high blood pressure. no chest pain. presentation is similar to previous visit when she was admitted for nstemi so will do cardiac workup.  Pako: 74 year old dfemale with history of htn. hld, stent in may here with htn, feeling lightheaded upon walking. no cp, no sob. similar presentation to when patient had nstemi. will get labs, cxr, ekg, cardiac enzymes, reasess.

## 2019-07-14 NOTE — ED PROVIDER NOTE - PMH
CAD (coronary artery disease)    Hyperlipidemia    Hypertension    No pertinent past medical history

## 2019-10-02 ENCOUNTER — NON-APPOINTMENT (OUTPATIENT)
Age: 74
End: 2019-10-02

## 2019-10-02 ENCOUNTER — APPOINTMENT (OUTPATIENT)
Dept: CARDIOLOGY | Facility: CLINIC | Age: 74
End: 2019-10-02
Payer: MEDICARE

## 2019-10-02 VITALS — SYSTOLIC BLOOD PRESSURE: 152 MMHG | OXYGEN SATURATION: 100 % | HEART RATE: 67 BPM | DIASTOLIC BLOOD PRESSURE: 81 MMHG

## 2019-10-02 PROBLEM — I25.10 ATHEROSCLEROTIC HEART DISEASE OF NATIVE CORONARY ARTERY WITHOUT ANGINA PECTORIS: Chronic | Status: ACTIVE | Noted: 2019-07-14

## 2019-10-02 PROCEDURE — 93000 ELECTROCARDIOGRAM COMPLETE: CPT

## 2019-10-02 PROCEDURE — 99215 OFFICE O/P EST HI 40 MIN: CPT

## 2020-01-08 ENCOUNTER — APPOINTMENT (OUTPATIENT)
Dept: CARDIOLOGY | Facility: CLINIC | Age: 75
End: 2020-01-08
Payer: MEDICARE

## 2020-01-08 VITALS — DIASTOLIC BLOOD PRESSURE: 79 MMHG | SYSTOLIC BLOOD PRESSURE: 145 MMHG | OXYGEN SATURATION: 98 % | HEART RATE: 81 BPM

## 2020-01-08 PROCEDURE — 93000 ELECTROCARDIOGRAM COMPLETE: CPT

## 2020-01-08 PROCEDURE — 99215 OFFICE O/P EST HI 40 MIN: CPT

## 2020-01-10 LAB
ALBUMIN SERPL ELPH-MCNC: 4.7 G/DL
ALP BLD-CCNC: 88 U/L
ALT SERPL-CCNC: 18 U/L
ANION GAP SERPL CALC-SCNC: 13 MMOL/L
APO LP(A) SERPL-MCNC: 17.9 NMOL/L
AST SERPL-CCNC: 23 U/L
BASOPHILS # BLD AUTO: 0.07 K/UL
BASOPHILS NFR BLD AUTO: 1.1 %
BILIRUB SERPL-MCNC: 0.4 MG/DL
BUN SERPL-MCNC: 16 MG/DL
CALCIUM SERPL-MCNC: 9.9 MG/DL
CHLORIDE SERPL-SCNC: 104 MMOL/L
CHOLEST SERPL-MCNC: 160 MG/DL
CHOLEST/HDLC SERPL: 3.3 RATIO
CO2 SERPL-SCNC: 26 MMOL/L
CREAT SERPL-MCNC: 0.85 MG/DL
EOSINOPHIL # BLD AUTO: 0.15 K/UL
EOSINOPHIL NFR BLD AUTO: 2.3 %
ESTIMATED AVERAGE GLUCOSE: 131 MG/DL
GLUCOSE SERPL-MCNC: 110 MG/DL
HBA1C MFR BLD HPLC: 6.2 %
HCT VFR BLD CALC: 45.6 %
HDLC SERPL-MCNC: 49 MG/DL
HGB BLD-MCNC: 14.5 G/DL
IMM GRANULOCYTES NFR BLD AUTO: 0.6 %
LDLC SERPL CALC-MCNC: 69 MG/DL
LYMPHOCYTES # BLD AUTO: 2.19 K/UL
LYMPHOCYTES NFR BLD AUTO: 34 %
MAN DIFF?: NORMAL
MCHC RBC-ENTMCNC: 29.7 PG
MCHC RBC-ENTMCNC: 31.8 GM/DL
MCV RBC AUTO: 93.3 FL
MONOCYTES # BLD AUTO: 0.29 K/UL
MONOCYTES NFR BLD AUTO: 4.5 %
NEUTROPHILS # BLD AUTO: 3.71 K/UL
NEUTROPHILS NFR BLD AUTO: 57.5 %
NT-PROBNP SERPL-MCNC: 49 PG/ML
PLATELET # BLD AUTO: 285 K/UL
POTASSIUM SERPL-SCNC: 4.6 MMOL/L
PROT SERPL-MCNC: 8 G/DL
RBC # BLD: 4.89 M/UL
RBC # FLD: 13.2 %
SODIUM SERPL-SCNC: 143 MMOL/L
TRIGL SERPL-MCNC: 210 MG/DL
TSH SERPL-ACNC: 1.51 UIU/ML
WBC # FLD AUTO: 6.45 K/UL

## 2020-04-08 ENCOUNTER — APPOINTMENT (OUTPATIENT)
Dept: CARDIOLOGY | Facility: CLINIC | Age: 75
End: 2020-04-08

## 2021-07-06 ENCOUNTER — APPOINTMENT (OUTPATIENT)
Dept: CARDIOLOGY | Facility: CLINIC | Age: 76
End: 2021-07-06
Payer: MEDICARE

## 2021-07-06 ENCOUNTER — NON-APPOINTMENT (OUTPATIENT)
Age: 76
End: 2021-07-06

## 2021-07-06 VITALS
BODY MASS INDEX: 24.11 KG/M2 | OXYGEN SATURATION: 98 % | DIASTOLIC BLOOD PRESSURE: 77 MMHG | SYSTOLIC BLOOD PRESSURE: 148 MMHG | WEIGHT: 131 LBS | HEIGHT: 62 IN | HEART RATE: 85 BPM

## 2021-07-06 PROCEDURE — 99215 OFFICE O/P EST HI 40 MIN: CPT

## 2021-07-06 PROCEDURE — 99072 ADDL SUPL MATRL&STAF TM PHE: CPT

## 2021-07-06 PROCEDURE — 93000 ELECTROCARDIOGRAM COMPLETE: CPT

## 2022-01-11 ENCOUNTER — NON-APPOINTMENT (OUTPATIENT)
Age: 77
End: 2022-01-11

## 2022-01-11 ENCOUNTER — APPOINTMENT (OUTPATIENT)
Dept: CARDIOLOGY | Facility: CLINIC | Age: 77
End: 2022-01-11
Payer: MEDICARE

## 2022-01-11 VITALS — DIASTOLIC BLOOD PRESSURE: 72 MMHG | OXYGEN SATURATION: 97 % | HEART RATE: 84 BPM | SYSTOLIC BLOOD PRESSURE: 142 MMHG

## 2022-01-11 PROCEDURE — 99215 OFFICE O/P EST HI 40 MIN: CPT

## 2022-01-11 PROCEDURE — 93000 ELECTROCARDIOGRAM COMPLETE: CPT

## 2022-03-14 NOTE — ED PROVIDER NOTE - ATTENDING CONTRIBUTION TO CARE
89
Patient presenting complaining of episodes of sharp neck pains associated with tingling sensation down bilateral arms.  Occurred earlier this morning and then resolved.  Has had similar sensation a few times over the past few days, no noted triggering factors.  Lasted minutes and self resolved.  No known cardiac disease.  Denying associated chest pains, shortness of breath, diaphoresis.  Never smoker.  Noted BP's to be elevated at home afterwards and persisting through day so presented to Emergency Department.    A 14 point review of systems is negative except as in HPI or otherwise documented.    Exam:  General: Patient well appearing, hypertensive otherwise vital signs within normal limits  HEENT: airway patent with moist mucous membranes  Cardiac: RRR S1/S2 with strong peripheral pulses  Respiratory: lungs clear without respiratory distress  GI: abdomen soft, non tender, non distended  Neuro: no gross neurologic deficits  Skin: warm, well perfused  Psych: normal mood and affect    Patient with unremarkable exam, hypertensive on presentation and at time of exam.  Given age, gender and history along with hypertension, am concerned that symptoms could be anginal equivalent - plan for EKG, labs, troponin.

## 2022-05-24 NOTE — H&P ADULT - NSHPLANGLIMITEDENGLISH_GEN_A_CORE
Instructions:  - [facility] Nurse Practitioner to schedule home follow-up visit with patient in 6-8 weeks or as needed.  - Continue all medications, treatments and therapies as ordered.   - Follow all instructions, recommendations as discussed.  - Maintain Safety Precautions at all times.  - Attend all medical appointments as scheduled.  - For worsening symptoms: call Primary Care Physician or Nurse Practitioner.  - For emergencies, call 911 or immediately report to the nearest emergency room.  - Limit Risks of environmental exposure to coronavirus/COVID-19 as discussed including: social distancing, hand hygiene, and limiting departures from the home for necessities only.      No

## 2022-06-07 ENCOUNTER — APPOINTMENT (OUTPATIENT)
Dept: CARDIOLOGY | Facility: CLINIC | Age: 77
End: 2022-06-07
Payer: MEDICARE

## 2022-06-07 ENCOUNTER — NON-APPOINTMENT (OUTPATIENT)
Age: 77
End: 2022-06-07

## 2022-06-07 VITALS
HEIGHT: 62 IN | DIASTOLIC BLOOD PRESSURE: 73 MMHG | HEART RATE: 78 BPM | WEIGHT: 132 LBS | BODY MASS INDEX: 24.29 KG/M2 | OXYGEN SATURATION: 99 % | SYSTOLIC BLOOD PRESSURE: 137 MMHG

## 2022-06-07 PROCEDURE — 93000 ELECTROCARDIOGRAM COMPLETE: CPT

## 2022-06-07 PROCEDURE — 99215 OFFICE O/P EST HI 40 MIN: CPT

## 2022-06-07 RX ORDER — QUINAPRIL HYDROCHLORIDE 5 MG/1
5 TABLET, FILM COATED ORAL
Refills: 0 | Status: DISCONTINUED | COMMUNITY
End: 2022-06-07

## 2022-06-07 RX ORDER — ASPIRIN ENTERIC COATED TABLETS 81 MG 81 MG/1
81 TABLET, DELAYED RELEASE ORAL
Refills: 6 | Status: DISCONTINUED | COMMUNITY
End: 2022-06-07

## 2022-06-07 RX ORDER — TICAGRELOR 90 MG/1
90 TABLET ORAL
Qty: 30 | Refills: 4 | Status: DISCONTINUED | COMMUNITY
End: 2022-06-07

## 2022-06-07 RX ORDER — ATORVASTATIN CALCIUM 80 MG/1
80 TABLET, FILM COATED ORAL
Qty: 90 | Refills: 3 | Status: DISCONTINUED | COMMUNITY
Start: 1900-01-01 | End: 2022-06-07

## 2022-06-08 LAB
APO LP(A) SERPL-MCNC: 18.6 NMOL/L
BASOPHILS # BLD AUTO: 0.06 K/UL
BASOPHILS NFR BLD AUTO: 0.6 %
CHOLEST SERPL-MCNC: 181 MG/DL
EOSINOPHIL # BLD AUTO: 0.16 K/UL
EOSINOPHIL NFR BLD AUTO: 1.7 %
ESTIMATED AVERAGE GLUCOSE: 131 MG/DL
HBA1C MFR BLD HPLC: 6.2 %
HCT VFR BLD CALC: 47 %
HDLC SERPL-MCNC: 41 MG/DL
HGB BLD-MCNC: 15.2 G/DL
IMM GRANULOCYTES NFR BLD AUTO: 0.4 %
LDLC SERPL CALC-MCNC: NORMAL MG/DL
LYMPHOCYTES # BLD AUTO: 3.05 K/UL
LYMPHOCYTES NFR BLD AUTO: 32.7 %
MAN DIFF?: NORMAL
MCHC RBC-ENTMCNC: 29.7 PG
MCHC RBC-ENTMCNC: 32.3 GM/DL
MCV RBC AUTO: 92 FL
MONOCYTES # BLD AUTO: 0.55 K/UL
MONOCYTES NFR BLD AUTO: 5.9 %
NEUTROPHILS # BLD AUTO: 5.47 K/UL
NEUTROPHILS NFR BLD AUTO: 58.7 %
NONHDLC SERPL-MCNC: 140 MG/DL
PLATELET # BLD AUTO: 217 K/UL
RBC # BLD: 5.11 M/UL
RBC # FLD: 13.6 %
TRIGL SERPL-MCNC: 453 MG/DL
TSH SERPL-ACNC: 1.42 UIU/ML
WBC # FLD AUTO: 9.33 K/UL

## 2022-06-10 LAB
ALBUMIN SERPL ELPH-MCNC: 5 G/DL
ALP BLD-CCNC: 97 U/L
ALT SERPL-CCNC: 21 U/L
ANION GAP SERPL CALC-SCNC: 13 MMOL/L
AST SERPL-CCNC: 29 U/L
BILIRUB SERPL-MCNC: 0.4 MG/DL
BUN SERPL-MCNC: 13 MG/DL
CALCIUM SERPL-MCNC: 9.9 MG/DL
CHLORIDE SERPL-SCNC: 102 MMOL/L
CO2 SERPL-SCNC: 25 MMOL/L
CREAT SERPL-MCNC: 1.03 MG/DL
EGFR: 56 ML/MIN/1.73M2
GLUCOSE SERPL-MCNC: 131 MG/DL
POTASSIUM SERPL-SCNC: 4.5 MMOL/L
PROT SERPL-MCNC: 8.2 G/DL
SODIUM SERPL-SCNC: 140 MMOL/L

## 2022-09-23 NOTE — ED ADULT NURSE NOTE - NS ED PATIENT SAFETY CONCERN
You can access the FollowMyHealth Patient Portal offered by [facility] by registering at the following website: [email or web address]. By joining [employer] [facility]’s FollowMyHealth portal, you will also be able to view your health information using other applications (apps) compatible with our system. No

## 2022-10-19 NOTE — PATIENT PROFILE ADULT - HARM RISK FACTORS
Anesthesia Volume In Cc: 5 Did You Provide Opioid Counseling: No Repair Type: Flap Suturegard Retention Suture: 2-0 Nylon Retention Suture Bite Size: 3 mm Length To Time In Minutes Device Was In Place: 10 Number Of Hemigard Strips Per Side: 1 Simple / Intermediate / Complex Repair - Final Wound Length In Cm: 0 Undermining Type: Entire Wound Debridement Text: The wound edges were debrided prior to proceeding with the closure to facilitate wound healing. Helical Rim Text: The closure involved the helical rim. Vermilion Border Text: The closure involved the vermilion border. Nostril Rim Text: The closure involved the nostril rim. Retention Suture Text: Retention sutures were placed to support the closure and prevent dehiscence. Flap Type: O-Z Flap Primary Defect Length (In Cm): 3.2 Primary Defect Width (In Cm): 3.6 Secondary Defect Length (In Cm): 6.5 Secondary Defect Width (In Cm): 3 Skin Substitute: EpiFix Micronized Suture Removal: 14 days Type Of Previous Surgery (Optional- Ie Mohs Surgery): mohs Include The Following Details In The Note (If No Details Will Only Be Reflected In The Surgical Fax): Yes Pre-Op Size Of Lesion Removed (Optional): 2.5 Mohs Case Number (Optional):  Previous Accession (Optional): [id number] Detail Level: Detailed Anesthesia Type: 1% lidocaine with epinephrine Additional Anesthesia Volume In Cc: 6 Hemostasis: Electrocautery Estimated Blood Loss (Cc): minimal Brow Lift Text: A midfrontal incision was made medially to the defect to allow access to the tissues just superior to the left eyebrow. Following careful dissection inferiorly in a supraperiosteal plane to the level of the left eyebrow, several 3-0 monocryl sutures were used to resuspend the eyebrow orbicularis oculi muscular unit to the superior frontal bone periosteum. This resulted in an appropriate reapproximation of static eyebrow symmetry and correction of the left brow ptosis. Deep Sutures: 4-0 Vicryl Epidermal Sutures: 4-0 Prolene Epidermal Closure: running and interrupted Wound Care: Bacitracin Dressing: pressure dressing with telfa Unna Boot Text: An Unna boot was placed to help immobilize the limb and facilitate more rapid healing. Suturegard Intro: Intraoperative tissue expansion was performed, utilizing the SUTUREGARD device, in order to reduce wound tension. Suturegard Body: The suture ends were repeatedly re-tightened and re-clamped to achieve the desired tissue expansion. Hemigard Intro: Due to skin fragility and wound tension, it was decided to use HEMIGARD adhesive retention suture devices to permit a linear closure. The skin was cleaned and dried for a 6cm distance away from the wound. Excessive hair, if present, was removed to allow for adhesion. Hemigard Postcare Instructions: The HEMIGARD strips are to remain completely dry for at least 5-7 days. Graft Donor Site Bandage (Optional-Leave Blank If You Don't Want In Note): Pressure bandage were applied to the donor site. Closure 2 Information: This tab is for additional flaps and grafts, including complex repair and grafts and complex repair and flaps. You can also specify a different location for the additional defect, if the location is the same you do not need to select a new one. We will insert the automated text for the repair you select below just as we do for solitary flaps and grafts. Please note that at this time if you select a location with a different insurance zone you will need to override the ICD10 and CPT if appropriate. Closure 3 Information: This tab is for additional flaps and grafts above and beyond our usual structured repairs.  Please note if you enter information here it will not currently bill and you will need to add the billing information manually. Closure 4 Information: This tab is for additional flaps and grafts above and beyond our usual structured repairs.  Please note if you enter information here it will not currently bill and you will need to add the billing information manually. Complex Repair Preamble Text (Leave Blank If You Do Not Want): Extensive wide undermining was performed. Intermediate Repair Preamble Text (Leave Blank If You Do Not Want): Undermining was performed with blunt dissection. Flap Thinning Complex Repair Preamble Text (Leave Blank If You Do Not Want): An incision was made along the previous flap suture line. Undermining was performed beneath the flap and redundant tissue was removed to restore the normal contour of the skin. Non-Graft Cartilage Fenestration Text: The cartilage was fenestrated with a 2mm punch biopsy to help facilitate healing. Graft Cartilage Fenestration Text: The cartilage was fenestrated with a 2mm punch biopsy to help facilitate graft survival and healing. no Preparation Of Recipient Site - Flap: The eschar was removed surgically with sharp dissection to facilitate appropriate wound healing of the following adjacent tissue rearrangement. Preparation Of Recipient Site - Graft: The eschar was removed surgically with sharp dissection to facilitate appropriate survival of the following graft. Preparation Of Recipient Site - Flap Takedown: The eschar and granulation tissue was removed surgically with sharp dissection to facilitate appropriate healing after division and inset of the proximal and distal interpolation flap. Secondary Intention Text (Leave Blank If You Do Not Want): The defect will heal with secondary intention. No Repair - Repaired With Adjacent Surgical Defect Text (Leave Blank If You Do Not Want): After obtaining clear surgical margins the defect was repaired concurrently with another surgical defect which was in close approximation. Referred To Oculoplastics For Closure Text (Leave Blank If You Do Not Want): After obtaining clear surgical margins the patient was sent to oculoplastics for surgical repair.  The patient understands they will receive post-surgical care and follow-up from the referring physician's office. Referred To Otolaryngology For Closure Text (Leave Blank If You Do Not Want): After obtaining clear surgical margins the patient was sent to otolaryngology for surgical repair.  The patient understands they will receive post-surgical care and follow-up from the referring physician's office. Referred To Plastics For Closure Text (Leave Blank If You Do Not Want): After obtaining clear surgical margins the patient was sent to plastics for surgical repair.  The patient understands they will receive post-surgical care and follow-up from the referring physician's office. Referred To Asc For Closure Text (Leave Blank If You Do Not Want): After obtaining clear surgical margins the patient was sent to an ASC for surgical repair.  The patient understands they will receive post-surgical care and follow-up from the ASC physician. Referred To Mid-Level For Closure Text (Leave Blank If You Do Not Want): After obtaining clear surgical margins the patient was sent to a mid-level provider for surgical repair.  The patient understands they will receive post-surgical care and follow-up from the mid-level provider. Repair Performed By Another Provider Text (Leave Blank If You Do Not Want): After obtaining clear surgical margins the defect was repaired by another provider. Adjacent Tissue Transfer Text: The defect edges were debeveled with a #15 scalpel blade.  Given the location of the defect and the proximity to free margins an adjacent tissue transfer was deemed most appropriate.  Using a sterile surgical marker, an appropriate flap was drawn incorporating the defect and placing the expected incisions within the relaxed skin tension lines where possible.    The area thus outlined was incised deep to adipose tissue with a #15 scalpel blade.  The skin margins were undermined to an appropriate distance in all directions utilizing iris scissors. Advancement Flap (Single) Text: The defect edges were debeveled with a #15 scalpel blade.  Given the location of the defect and the proximity to free margins a single advancement flap was deemed most appropriate.  Using a sterile surgical marker, an appropriate advancement flap was drawn incorporating the defect and placing the expected incisions within the relaxed skin tension lines where possible.    The area thus outlined was incised deep to adipose tissue with a #15 scalpel blade.  The skin margins were undermined to an appropriate distance in all directions utilizing iris scissors. Advancement Flap (Double) Text: The defect edges were debeveled with a #15 scalpel blade.  Given the location of the defect and the proximity to free margins a double advancement flap was deemed most appropriate.  Using a sterile surgical marker, the appropriate advancement flaps were drawn incorporating the defect and placing the expected incisions within the relaxed skin tension lines where possible.    The area thus outlined was incised deep to adipose tissue with a #15 scalpel blade.  The skin margins were undermined to an appropriate distance in all directions utilizing iris scissors. Burow's Advancement Flap Text: The defect edges were debeveled with a #15 scalpel blade.  Given the location of the defect and the proximity to free margins a Burow's advancement flap was deemed most appropriate.  Using a sterile surgical marker, the appropriate advancement flap was drawn incorporating the defect and placing the expected incisions within the relaxed skin tension lines where possible.    The area thus outlined was incised deep to adipose tissue with a #15 scalpel blade.  The skin margins were undermined to an appropriate distance in all directions utilizing iris scissors. Chonodrocutaneous Helical Advancement Flap Text: The defect edges were debeveled with a #15 scalpel blade.  Given the location of the defect and the proximity to free margins a chondrocutaneous helical advancement flap was deemed most appropriate.  Using a sterile surgical marker, the appropriate advancement flap was drawn incorporating the defect and placing the expected incisions within the relaxed skin tension lines where possible.    The area thus outlined was incised deep to adipose tissue with a #15 scalpel blade.  The skin margins were undermined to an appropriate distance in all directions utilizing iris scissors. Crescentic Advancement Flap Text: The defect edges were debeveled with a #15 scalpel blade.  Given the location of the defect and the proximity to free margins a crescentic advancement flap was deemed most appropriate.  Using a sterile surgical marker, the appropriate advancement flap was drawn incorporating the defect and placing the expected incisions within the relaxed skin tension lines where possible.    The area thus outlined was incised deep to adipose tissue with a #15 scalpel blade.  The skin margins were undermined to an appropriate distance in all directions utilizing iris scissors. A-T Advancement Flap Text: The defect edges were debeveled with a #15 scalpel blade.  Given the location of the defect, shape of the defect and the proximity to free margins an A-T advancement flap was deemed most appropriate.  Using a sterile surgical marker, an appropriate advancement flap was drawn incorporating the defect and placing the expected incisions within the relaxed skin tension lines where possible.    The area thus outlined was incised deep to adipose tissue with a #15 scalpel blade.  The skin margins were undermined to an appropriate distance in all directions utilizing iris scissors. O-T Advancement Flap Text: The defect edges were debeveled with a #15 scalpel blade.  Given the location of the defect, shape of the defect and the proximity to free margins an O-T advancement flap was deemed most appropriate.  Using a sterile surgical marker, an appropriate advancement flap was drawn incorporating the defect and placing the expected incisions within the relaxed skin tension lines where possible.    The area thus outlined was incised deep to adipose tissue with a #15 scalpel blade.  The skin margins were undermined to an appropriate distance in all directions utilizing iris scissors. O-L Flap Text: The defect edges were debeveled with a #15 scalpel blade.  Given the location of the defect, shape of the defect and the proximity to free margins an O-L flap was deemed most appropriate.  Using a sterile surgical marker, an appropriate advancement flap was drawn incorporating the defect and placing the expected incisions within the relaxed skin tension lines where possible.    The area thus outlined was incised deep to adipose tissue with a #15 scalpel blade.  The skin margins were undermined to an appropriate distance in all directions utilizing iris scissors. O-Z Flap Text: The defect edges were debeveled with a #15 scalpel blade.  Given the location of the defect, shape of the defect and the proximity to free margins an O-Z flap was deemed most appropriate.  Using a sterile surgical marker, an appropriate transposition flap was drawn incorporating the defect and placing the expected incisions within the relaxed skin tension lines where possible. The area thus outlined was incised deep to adipose tissue with a #15 scalpel blade.  The skin margins were undermined to an appropriate distance in all directions utilizing iris scissors. Double O-Z Flap Text: The defect edges were debeveled with a #15 scalpel blade.  Given the location of the defect, shape of the defect and the proximity to free margins a Double O-Z flap was deemed most appropriate.  Using a sterile surgical marker, an appropriate transposition flap was drawn incorporating the defect and placing the expected incisions within the relaxed skin tension lines where possible. The area thus outlined was incised deep to adipose tissue with a #15 scalpel blade.  The skin margins were undermined to an appropriate distance in all directions utilizing iris scissors. V-Y Flap Text: The defect edges were debeveled with a #15 scalpel blade.  Given the location of the defect, shape of the defect and the proximity to free margins a V-Y flap was deemed most appropriate.  Using a sterile surgical marker, an appropriate advancement flap was drawn incorporating the defect and placing the expected incisions within the relaxed skin tension lines where possible.    The area thus outlined was incised deep to adipose tissue with a #15 scalpel blade.  The skin margins were undermined to an appropriate distance in all directions utilizing iris scissors. Advancement-Rotation Flap Text: The defect edges were debeveled with a #15 scalpel blade.  Given the location of the defect, shape of the defect and the proximity to free margins an advancement-rotation flap was deemed most appropriate.  Using a sterile surgical marker, an appropriate advancement flap was drawn incorporating the defect and placing the expected incisions within the relaxed skin tension lines where possible.    The area thus outlined was incised deep to adipose tissue with a #15 scalpel blade.  The skin margins were undermined to an appropriate distance in all directions utilizing iris scissors. Mercedes Flap Text: The defect edges were debeveled with a #15 scalpel blade.  Given the location of the defect, shape of the defect and the proximity to free margins a Mercedes flap was deemed most appropriate.  Using a sterile surgical marker, an appropriate advancement flap was drawn incorporating the defect and placing the expected incisions within the relaxed skin tension lines where possible. The area thus outlined was incised deep to adipose tissue with a #15 scalpel blade.  The skin margins were undermined to an appropriate distance in all directions utilizing iris scissors. Modified Advancement Flap Text: The defect edges were debeveled with a #15 scalpel blade.  Given the location of the defect, shape of the defect and the proximity to free margins a modified advancement flap was deemed most appropriate.  Using a sterile surgical marker, an appropriate advancement flap was drawn incorporating the defect and placing the expected incisions within the relaxed skin tension lines where possible.    The area thus outlined was incised deep to adipose tissue with a #15 scalpel blade.  The skin margins were undermined to an appropriate distance in all directions utilizing iris scissors. Mucosal Advancement Flap Text: Given the location of the defect, shape of the defect and the proximity to free margins a mucosal advancement flap was deemed most appropriate. Incisions were made with a 15 blade scalpel in the appropriate fashion along the cutaneous vermilion border and the mucosal lip. The remaining actinically damaged mucosal tissue was excised.  The mucosal advancement flap was then elevated to the gingival sulcus with care taken to preserve the neurovascular structures and advanced into the primary defect. Care was taken to ensure that precise realignment of the vermilion border was achieved. Peng Advancement Flap Text: The defect edges were debeveled with a #15 scalpel blade.  Given the location of the defect, shape of the defect and the proximity to free margins a Peng advancement flap was deemed most appropriate.  Using a sterile surgical marker, an appropriate advancement flap was drawn incorporating the defect and placing the expected incisions within the relaxed skin tension lines where possible. The area thus outlined was incised deep to adipose tissue with a #15 scalpel blade.  The skin margins were undermined to an appropriate distance in all directions utilizing iris scissors. Hatchet Flap Text: The defect edges were debeveled with a #15 scalpel blade.  Given the location of the defect, shape of the defect and the proximity to free margins a hatchet flap was deemed most appropriate.  Using a sterile surgical marker, an appropriate hatchet flap was drawn incorporating the defect and placing the expected incisions within the relaxed skin tension lines where possible.    The area thus outlined was incised deep to adipose tissue with a #15 scalpel blade.  The skin margins were undermined to an appropriate distance in all directions utilizing iris scissors. Rotation Flap Text: The defect edges were debeveled with a #15 scalpel blade.  Given the location of the defect, shape of the defect and the proximity to free margins a rotation flap was deemed most appropriate.  Using a sterile surgical marker, an appropriate rotation flap was drawn incorporating the defect and placing the expected incisions within the relaxed skin tension lines where possible.    The area thus outlined was incised deep to adipose tissue with a #15 scalpel blade.  The skin margins were undermined to an appropriate distance in all directions utilizing iris scissors. Spiral Flap Text: The defect edges were debeveled with a #15 scalpel blade.  Given the location of the defect, shape of the defect and the proximity to free margins a spiral flap was deemed most appropriate.  Using a sterile surgical marker, an appropriate rotation flap was drawn incorporating the defect and placing the expected incisions within the relaxed skin tension lines where possible. The area thus outlined was incised deep to adipose tissue with a #15 scalpel blade.  The skin margins were undermined to an appropriate distance in all directions utilizing iris scissors. Staged Advancement Flap Text: The defect edges were debeveled with a #15 scalpel blade.  Given the location of the defect, shape of the defect and the proximity to free margins a staged advancement flap was deemed most appropriate.  Using a sterile surgical marker, an appropriate advancement flap was drawn incorporating the defect and placing the expected incisions within the relaxed skin tension lines where possible. The area thus outlined was incised deep to adipose tissue with a #15 scalpel blade.  The skin margins were undermined to an appropriate distance in all directions utilizing iris scissors. Star Wedge Flap Text: The defect edges were debeveled with a #15 scalpel blade.  Given the location of the defect, shape of the defect and the proximity to free margins a star wedge flap was deemed most appropriate.  Using a sterile surgical marker, an appropriate rotation flap was drawn incorporating the defect and placing the expected incisions within the relaxed skin tension lines where possible. The area thus outlined was incised deep to adipose tissue with a #15 scalpel blade.  The skin margins were undermined to an appropriate distance in all directions utilizing iris scissors. Transposition Flap Text: The defect edges were debeveled with a #15 scalpel blade.  Given the location of the defect and the proximity to free margins a transposition flap was deemed most appropriate.  Using a sterile surgical marker, an appropriate transposition flap was drawn incorporating the defect.    The area thus outlined was incised deep to adipose tissue with a #15 scalpel blade.  The skin margins were undermined to an appropriate distance in all directions utilizing iris scissors. Muscle Hinge Flap Text: The defect edges were debeveled with a #15 scalpel blade.  Given the size, depth and location of the defect and the proximity to free margins a muscle hinge flap was deemed most appropriate.  Using a sterile surgical marker, an appropriate hinge flap was drawn incorporating the defect. The area thus outlined was incised with a #15 scalpel blade.  The skin margins were undermined to an appropriate distance in all directions utilizing iris scissors. Mustarde Flap Text: The defect edges were debeveled with a #15 scalpel blade.  Given the size, depth and location of the defect and the proximity to free margins a Mustarde flap was deemed most appropriate.  Using a sterile surgical marker, an appropriate flap was drawn incorporating the defect. The area thus outlined was incised with a #15 scalpel blade.  The skin margins were undermined to an appropriate distance in all directions utilizing iris scissors. Nasal Turnover Hinge Flap Text: The defect edges were debeveled with a #15 scalpel blade.  Given the size, depth, location of the defect and the defect being full thickness a nasal turnover hinge flap was deemed most appropriate.  Using a sterile surgical marker, an appropriate hinge flap was drawn incorporating the defect. The area thus outlined was incised with a #15 scalpel blade. The flap was designed to recreate the nasal mucosal lining and the alar rim. The skin margins were undermined to an appropriate distance in all directions utilizing iris scissors. Nasalis-Muscle-Based Myocutaneous Island Pedicle Flap Text: Using a #15 blade, an incision was made around the donor flap to the level of the nasalis muscle. Wide lateral undermining was then performed in both the subcutaneous plane above the nasalis muscle, and in a submuscular plane just above periosteum. This allowed the formation of a free nasalis muscle axial pedicle (based on the angular artery) which was still attached to the actual cutaneous flap, increasing its mobility and vascular viability. Hemostasis was obtained with pinpoint electrocoagulation. The flap was mobilized into position and the pivotal anchor points positioned and stabilized with buried interrupted sutures. Subcutaneous and dermal tissues were closed in a multilayered fashion with sutures. Tissue redundancies were excised, and the epidermal edges were apposed without significant tension and sutured with sutures. Orbicularis Oris Muscle Flap Text: The defect edges were debeveled with a #15 scalpel blade.  Given that the defect affected the competency of the oral sphincter an orbicularis oris muscle flap was deemed most appropriate to restore this competency and normal muscle function.  Using a sterile surgical marker, an appropriate flap was drawn incorporating the defect. The area thus outlined was incised with a #15 scalpel blade. Melolabial Transposition Flap Text: The defect edges were debeveled with a #15 scalpel blade.  Given the location of the defect and the proximity to free margins a melolabial flap was deemed most appropriate.  Using a sterile surgical marker, an appropriate melolabial transposition flap was drawn incorporating the defect.    The area thus outlined was incised deep to adipose tissue with a #15 scalpel blade.  The skin margins were undermined to an appropriate distance in all directions utilizing iris scissors. Rhombic Flap Text: The defect edges were debeveled with a #15 scalpel blade.  Given the location of the defect and the proximity to free margins a rhombic flap was deemed most appropriate.  Using a sterile surgical marker, an appropriate rhombic flap was drawn incorporating the defect.    The area thus outlined was incised deep to adipose tissue with a #15 scalpel blade.  The skin margins were undermined to an appropriate distance in all directions utilizing iris scissors. Rhomboid Transposition Flap Text: The defect edges were debeveled with a #15 scalpel blade.  Given the location of the defect and the proximity to free margins a rhomboid transposition flap was deemed most appropriate.  Using a sterile surgical marker, an appropriate rhomboid flap was drawn incorporating the defect.    The area thus outlined was incised deep to adipose tissue with a #15 scalpel blade.  The skin margins were undermined to an appropriate distance in all directions utilizing iris scissors. Bi-Rhombic Flap Text: The defect edges were debeveled with a #15 scalpel blade.  Given the location of the defect and the proximity to free margins a bi-rhombic flap was deemed most appropriate.  Using a sterile surgical marker, an appropriate rhombic flap was drawn incorporating the defect. The area thus outlined was incised deep to adipose tissue with a #15 scalpel blade.  The skin margins were undermined to an appropriate distance in all directions utilizing iris scissors. Helical Rim Advancement Flap Text: The defect edges were debeveled with a #15 blade scalpel.  Given the location of the defect and the proximity to free margins (helical rim) a double helical rim advancement flap was deemed most appropriate.  Using a sterile surgical marker, the appropriate advancement flaps were drawn incorporating the defect and placing the expected incisions between the helical rim and antihelix where possible.  The area thus outlined was incised through and through with a #15 scalpel blade.  With a skin hook and iris scissors, the flaps were gently and sharply undermined and freed up. Bilateral Helical Rim Advancement Flap Text: The defect edges were debeveled with a #15 blade scalpel.  Given the location of the defect and the proximity to free margins (helical rim) a bilateral helical rim advancement flap was deemed most appropriate.  Using a sterile surgical marker, the appropriate advancement flaps were drawn incorporating the defect and placing the expected incisions between the helical rim and antihelix where possible.  The area thus outlined was incised through and through with a #15 scalpel blade.  With a skin hook and iris scissors, the flaps were gently and sharply undermined and freed up. Ear Star Wedge Flap Text: The defect edges were debeveled with a #15 blade scalpel.  Given the location of the defect and the proximity to free margins (helical rim) an ear star wedge flap was deemed most appropriate.  Using a sterile surgical marker, the appropriate flap was drawn incorporating the defect and placing the expected incisions between the helical rim and antihelix where possible.  The area thus outlined was incised through and through with a #15 scalpel blade. Banner Transposition Flap Text: The defect edges were debeveled with a #15 scalpel blade.  Given the location of the defect and the proximity to free margins a Banner transposition flap was deemed most appropriate.  Using a sterile surgical marker, an appropriate flap drawn around the defect. The area thus outlined was incised deep to adipose tissue with a #15 scalpel blade.  The skin margins were undermined to an appropriate distance in all directions utilizing iris scissors. Bilobed Flap Text: The defect edges were debeveled with a #15 scalpel blade.  Given the location of the defect and the proximity to free margins a bilobe flap was deemed most appropriate.  Using a sterile surgical marker, an appropriate bilobe flap drawn around the defect.    The area thus outlined was incised deep to adipose tissue with a #15 scalpel blade.  The skin margins were undermined to an appropriate distance in all directions utilizing iris scissors. Bilobed Transposition Flap Text: The defect edges were debeveled with a #15 scalpel blade.  Given the location of the defect and the proximity to free margins a bilobed transposition flap was deemed most appropriate.  Using a sterile surgical marker, an appropriate bilobe flap drawn around the defect.    The area thus outlined was incised deep to adipose tissue with a #15 scalpel blade.  The skin margins were undermined to an appropriate distance in all directions utilizing iris scissors. Trilobed Flap Text: The defect edges were debeveled with a #15 scalpel blade.  Given the location of the defect and the proximity to free margins a trilobed flap was deemed most appropriate.  Using a sterile surgical marker, an appropriate trilobed flap drawn around the defect.    The area thus outlined was incised deep to adipose tissue with a #15 scalpel blade.  The skin margins were undermined to an appropriate distance in all directions utilizing iris scissors. Dorsal Nasal Flap Text: The defect edges were debeveled with a #15 scalpel blade.  Given the location of the defect and the proximity to free margins a dorsal nasal flap was deemed most appropriate.  Using a sterile surgical marker, an appropriate dorsal nasal flap was drawn around the defect.    The area thus outlined was incised deep to adipose tissue with a #15 scalpel blade.  The skin margins were undermined to an appropriate distance in all directions utilizing iris scissors. Island Pedicle Flap Text: The defect edges were debeveled with a #15 scalpel blade.  Given the location of the defect, shape of the defect and the proximity to free margins an island pedicle advancement flap was deemed most appropriate.  Using a sterile surgical marker, an appropriate advancement flap was drawn incorporating the defect, outlining the appropriate donor tissue and placing the expected incisions within the relaxed skin tension lines where possible.    The area thus outlined was incised deep to adipose tissue with a #15 scalpel blade.  The skin margins were undermined to an appropriate distance in all directions around the primary defect and laterally outward around the island pedicle utilizing iris scissors.  There was minimal undermining beneath the pedicle flap. Island Pedicle Flap With Canthal Suspension Text: The defect edges were debeveled with a #15 scalpel blade.  Given the location of the defect, shape of the defect and the proximity to free margins an island pedicle advancement flap was deemed most appropriate.  Using a sterile surgical marker, an appropriate advancement flap was drawn incorporating the defect, outlining the appropriate donor tissue and placing the expected incisions within the relaxed skin tension lines where possible. The area thus outlined was incised deep to adipose tissue with a #15 scalpel blade.  The skin margins were undermined to an appropriate distance in all directions around the primary defect and laterally outward around the island pedicle utilizing iris scissors.  There was minimal undermining beneath the pedicle flap. A suspension suture was placed in the canthal tendon to prevent tension and prevent ectropion. Alar Island Pedicle Flap Text: The defect edges were debeveled with a #15 scalpel blade.  Given the location of the defect, shape of the defect and the proximity to the alar rim an island pedicle advancement flap was deemed most appropriate.  Using a sterile surgical marker, an appropriate advancement flap was drawn incorporating the defect, outlining the appropriate donor tissue and placing the expected incisions within the nasal ala running parallel to the alar rim. The area thus outlined was incised with a #15 scalpel blade.  The skin margins were undermined minimally to an appropriate distance in all directions around the primary defect and laterally outward around the island pedicle utilizing iris scissors.  There was minimal undermining beneath the pedicle flap. Double Island Pedicle Flap Text: The defect edges were debeveled with a #15 scalpel blade.  Given the location of the defect, shape of the defect and the proximity to free margins a double island pedicle advancement flap was deemed most appropriate.  Using a sterile surgical marker, an appropriate advancement flap was drawn incorporating the defect, outlining the appropriate donor tissue and placing the expected incisions within the relaxed skin tension lines where possible.    The area thus outlined was incised deep to adipose tissue with a #15 scalpel blade.  The skin margins were undermined to an appropriate distance in all directions around the primary defect and laterally outward around the island pedicle utilizing iris scissors.  There was minimal undermining beneath the pedicle flap. Island Pedicle Flap-Requiring Vessel Identification Text: The defect edges were debeveled with a #15 scalpel blade.  Given the location of the defect, shape of the defect and the proximity to free margins an island pedicle advancement flap was deemed most appropriate.  Using a sterile surgical marker, an appropriate advancement flap was drawn, based on the axial vessel mentioned above, incorporating the defect, outlining the appropriate donor tissue and placing the expected incisions within the relaxed skin tension lines where possible.    The area thus outlined was incised deep to adipose tissue with a #15 scalpel blade.  The skin margins were undermined to an appropriate distance in all directions around the primary defect and laterally outward around the island pedicle utilizing iris scissors.  There was minimal undermining beneath the pedicle flap. Keystone Flap Text: The defect edges were debeveled with a #15 scalpel blade.  Given the location of the defect, shape of the defect a keystone flap was deemed most appropriate.  Using a sterile surgical marker, an appropriate keystone flap was drawn incorporating the defect, outlining the appropriate donor tissue and placing the expected incisions within the relaxed skin tension lines where possible. The area thus outlined was incised deep to adipose tissue with a #15 scalpel blade.  The skin margins were undermined to an appropriate distance in all directions around the primary defect and laterally outward around the flap utilizing iris scissors. O-T Plasty Text: The defect edges were debeveled with a #15 scalpel blade.  Given the location of the defect, shape of the defect and the proximity to free margins an O-T plasty was deemed most appropriate.  Using a sterile surgical marker, an appropriate O-T plasty was drawn incorporating the defect and placing the expected incisions within the relaxed skin tension lines where possible.    The area thus outlined was incised deep to adipose tissue with a #15 scalpel blade.  The skin margins were undermined to an appropriate distance in all directions utilizing iris scissors. O-Z Plasty Text: The defect edges were debeveled with a #15 scalpel blade.  Given the location of the defect, shape of the defect and the proximity to free margins an O-Z plasty (double transposition flap) was deemed most appropriate.  Using a sterile surgical marker, the appropriate transposition flaps were drawn incorporating the defect and placing the expected incisions within the relaxed skin tension lines where possible.    The area thus outlined was incised deep to adipose tissue with a #15 scalpel blade.  The skin margins were undermined to an appropriate distance in all directions utilizing iris scissors.  Hemostasis was achieved with electrocautery.  The flaps were then transposed into place, one clockwise and the other counterclockwise, and anchored with interrupted buried subcutaneous sutures. Double O-Z Plasty Text: The defect edges were debeveled with a #15 scalpel blade.  Given the location of the defect, shape of the defect and the proximity to free margins a Double O-Z plasty (double transposition flap) was deemed most appropriate.  Using a sterile surgical marker, the appropriate transposition flaps were drawn incorporating the defect and placing the expected incisions within the relaxed skin tension lines where possible. The area thus outlined was incised deep to adipose tissue with a #15 scalpel blade.  The skin margins were undermined to an appropriate distance in all directions utilizing iris scissors.  Hemostasis was achieved with electrocautery.  The flaps were then transposed into place, one clockwise and the other counterclockwise, and anchored with interrupted buried subcutaneous sutures. V-Y Plasty Text: The defect edges were debeveled with a #15 scalpel blade.  Given the location of the defect, shape of the defect and the proximity to free margins an V-Y advancement flap was deemed most appropriate.  Using a sterile surgical marker, an appropriate advancement flap was drawn incorporating the defect and placing the expected incisions within the relaxed skin tension lines where possible.    The area thus outlined was incised deep to adipose tissue with a #15 scalpel blade.  The skin margins were undermined to an appropriate distance in all directions utilizing iris scissors. H Plasty Text: Given the location of the defect, shape of the defect and the proximity to free margins a H-plasty was deemed most appropriate for repair.  Using a sterile surgical marker, the appropriate advancement arms of the H-plasty were drawn incorporating the defect and placing the expected incisions within the relaxed skin tension lines where possible. The area thus outlined was incised deep to adipose tissue with a #15 scalpel blade. The skin margins were undermined to an appropriate distance in all directions utilizing iris scissors.  The opposing advancement arms were then advanced into place in opposite direction and anchored with interrupted buried subcutaneous sutures. W Plasty Text: The lesion was extirpated to the level of the fat with a #15 scalpel blade.  Given the location of the defect, shape of the defect and the proximity to free margins a W-plasty was deemed most appropriate for repair.  Using a sterile surgical marker, the appropriate transposition arms of the W-plasty were drawn incorporating the defect and placing the expected incisions within the relaxed skin tension lines where possible.    The area thus outlined was incised deep to adipose tissue with a #15 scalpel blade.  The skin margins were undermined to an appropriate distance in all directions utilizing iris scissors.  The opposing transposition arms were then transposed into place in opposite direction and anchored with interrupted buried subcutaneous sutures. Z Plasty Text: The lesion was extirpated to the level of the fat with a #15 scalpel blade.  Given the location of the defect, shape of the defect and the proximity to free margins a Z-plasty was deemed most appropriate for repair.  Using a sterile surgical marker, the appropriate transposition arms of the Z-plasty were drawn incorporating the defect and placing the expected incisions within the relaxed skin tension lines where possible.    The area thus outlined was incised deep to adipose tissue with a #15 scalpel blade.  The skin margins were undermined to an appropriate distance in all directions utilizing iris scissors.  The opposing transposition arms were then transposed into place in opposite direction and anchored with interrupted buried subcutaneous sutures. Zygomaticofacial Flap Text: Given the location of the defect, shape of the defect and the proximity to free margins a zygomaticofacial flap was deemed most appropriate for repair.  Using a sterile surgical marker, the appropriate flap was drawn incorporating the defect and placing the expected incisions within the relaxed skin tension lines where possible. The area thus outlined was incised deep to adipose tissue with a #15 scalpel blade with preservation of a vascular pedicle.  The skin margins were undermined to an appropriate distance in all directions utilizing iris scissors.  The flap was then placed into the defect and anchored with interrupted buried subcutaneous sutures. Cheek Interpolation Flap Text: A decision was made to reconstruct the defect utilizing an interpolation axial flap and a staged reconstruction.  A telfa template was made of the defect.  This telfa template was then used to outline the Cheek Interpolation flap.  The donor area for the pedicle flap was then injected with anesthesia.  The flap was excised through the skin and subcutaneous tissue down to the layer of the underlying musculature.  The interpolation flap was carefully excised within this deep plane to maintain its blood supply.  The edges of the donor site were undermined.   The donor site was closed in a primary fashion.  The pedicle was then rotated into position and sutured.  Once the tube was sutured into place, adequate blood supply was confirmed with blanching and refill.  The pedicle was then wrapped with xeroform gauze and dressed appropriately with a telfa and gauze bandage to ensure continued blood supply and protect the attached pedicle. Cheek-To-Nose Interpolation Flap Text: A decision was made to reconstruct the defect utilizing an interpolation axial flap and a staged reconstruction.  A telfa template was made of the defect.  This telfa template was then used to outline the Cheek-To-Nose Interpolation flap.  The donor area for the pedicle flap was then injected with anesthesia.  The flap was excised through the skin and subcutaneous tissue down to the layer of the underlying musculature.  The interpolation flap was carefully excised within this deep plane to maintain its blood supply.  The edges of the donor site were undermined.   The donor site was closed in a primary fashion.  The pedicle was then rotated into position and sutured.  Once the tube was sutured into place, adequate blood supply was confirmed with blanching and refill.  The pedicle was then wrapped with xeroform gauze and dressed appropriately with a telfa and gauze bandage to ensure continued blood supply and protect the attached pedicle. Interpolation Flap Text: A decision was made to reconstruct the defect utilizing an interpolation axial flap and a staged reconstruction.  A telfa template was made of the defect.  This telfa template was then used to outline the interpolation flap.  The donor area for the pedicle flap was then injected with anesthesia.  The flap was excised through the skin and subcutaneous tissue down to the layer of the underlying musculature.  The interpolation flap was carefully excised within this deep plane to maintain its blood supply.  The edges of the donor site were undermined.   The donor site was closed in a primary fashion.  The pedicle was then rotated into position and sutured.  Once the tube was sutured into place, adequate blood supply was confirmed with blanching and refill.  The pedicle was then wrapped with xeroform gauze and dressed appropriately with a telfa and gauze bandage to ensure continued blood supply and protect the attached pedicle. Melolabial Interpolation Flap Text: A decision was made to reconstruct the defect utilizing an interpolation axial flap and a staged reconstruction.  A telfa template was made of the defect.  This telfa template was then used to outline the melolabial interpolation flap.  The donor area for the pedicle flap was then injected with anesthesia.  The flap was excised through the skin and subcutaneous tissue down to the layer of the underlying musculature.  The pedicle flap was carefully excised within this deep plane to maintain its blood supply.  The edges of the donor site were undermined.   The donor site was closed in a primary fashion.  The pedicle was then rotated into position and sutured.  Once the tube was sutured into place, adequate blood supply was confirmed with blanching and refill.  The pedicle was then wrapped with xeroform gauze and dressed appropriately with a telfa and gauze bandage to ensure continued blood supply and protect the attached pedicle. Mastoid Interpolation Flap Text: A decision was made to reconstruct the defect utilizing an interpolation axial flap and a staged reconstruction.  A telfa template was made of the defect.  This telfa template was then used to outline the mastoid interpolation flap.  The donor area for the pedicle flap was then injected with anesthesia.  The flap was excised through the skin and subcutaneous tissue down to the layer of the underlying musculature.  The pedicle flap was carefully excised within this deep plane to maintain its blood supply.  The edges of the donor site were undermined.   The donor site was closed in a primary fashion.  The pedicle was then rotated into position and sutured.  Once the tube was sutured into place, adequate blood supply was confirmed with blanching and refill.  The pedicle was then wrapped with xeroform gauze and dressed appropriately with a telfa and gauze bandage to ensure continued blood supply and protect the attached pedicle. Posterior Auricular Interpolation Flap Text: A decision was made to reconstruct the defect utilizing an interpolation axial flap and a staged reconstruction.  A telfa template was made of the defect.  This telfa template was then used to outline the posterior auricular interpolation flap.  The donor area for the pedicle flap was then injected with anesthesia.  The flap was excised through the skin and subcutaneous tissue down to the layer of the underlying musculature.  The pedicle flap was carefully excised within this deep plane to maintain its blood supply.  The edges of the donor site were undermined.   The donor site was closed in a primary fashion.  The pedicle was then rotated into position and sutured.  Once the tube was sutured into place, adequate blood supply was confirmed with blanching and refill.  The pedicle was then wrapped with xeroform gauze and dressed appropriately with a telfa and gauze bandage to ensure continued blood supply and protect the attached pedicle. Paramedian Forehead Flap Text: A decision was made to reconstruct the defect utilizing an interpolation axial flap and a staged reconstruction.  A telfa template was made of the defect.  This telfa template was then used to outline the paramedian forehead pedicle flap.  The donor area for the pedicle flap was then injected with anesthesia.  The flap was excised through the skin and subcutaneous tissue down to the layer of the underlying musculature.  The pedicle flap was carefully excised within this deep plane to maintain its blood supply.  The edges of the donor site were undermined.   The donor site was closed in a primary fashion.  The pedicle was then rotated into position and sutured.  Once the tube was sutured into place, adequate blood supply was confirmed with blanching and refill.  The pedicle was then wrapped with xeroform gauze and dressed appropriately with a telfa and gauze bandage to ensure continued blood supply and protect the attached pedicle. Abbe Flap (Upper To Lower Lip) Text: The defect of the lower lip was assessed and measured.  Given the location and size of the defect, an Abbe flap was deemed most appropriate.  Using a sterile surgical marker, an appropriate Abbe flap was measured and drawn on the upper lip. Local anesthesia was then infiltrated.  A scalpel was then used to incise the upper lip through and through the skin, vermilion, muscle and mucosa, leaving the flap pedicled on the opposite side.  The flap was then rotated and transferred to the lower lip defect.  The flap was then sutured into place with a three layer technique, closing the orbicularis oris muscle layer with subcutaneous buried sutures, followed by a mucosal layer and an epidermal layer. Abbe Flap (Lower To Upper Lip) Text: The defect of the upper lip was assessed and measured.  Given the location and size of the defect, an Abbe flap was deemed most appropriate.  Using a sterile surgical marker, an appropriate Abbe flap was measured and drawn on the lower lip. Local anesthesia was then infiltrated. A scalpel was then used to incise the upper lip through and through the skin, vermilion, muscle and mucosa, leaving the flap pedicled on the opposite side.  The flap was then rotated and transferred to the lower lip defect.  The flap was then sutured into place with a three layer technique, closing the orbicularis oris muscle layer with subcutaneous buried sutures, followed by a mucosal layer and an epidermal layer. Estlander Flap (Upper To Lower Lip) Text: The defect of the lower lip was assessed and measured.  Given the location and size of the defect, an Estlander flap was deemed most appropriate.  Using a sterile surgical marker, an appropriate Estlander flap was measured and drawn on the upper lip. Local anesthesia was then infiltrated. A scalpel was then used to incise the lateral aspect of the flap, through skin, muscle and mucosa, leaving the flap pedicled medially.  The flap was then rotated and positioned to fill the lower lip defect.  The flap was then sutured into place with a three layer technique, closing the orbicularis oris muscle layer with subcutaneous buried sutures, followed by a mucosal layer and an epidermal layer. Estlander Flap (Lower To Upper Lip) Text: The defect of the lower lip was assessed and measured.  Given the location and size of the defect, an Estlander flap was deemed most appropriate.  Using a sterile surgical marker, an appropriate Estlander flap was measured and drawn on the upper lip. Local anesthesia was then infiltrated. A scalpel was then used to incise the lateral aspect of the flap, through skin, muscle and mucosa, leaving the flap pedicled medially.  The flap was then rotated and positioned to fill the lower lip defect.  The flap was then sutured into place with a three layer technique, closing the orbicularis oris muscle layer with subcutaneous buried sutures, followed by a mucosal layer and an epidermal layer. Cheiloplasty (Less Than 50%) Text: A decision was made to reconstruct the defect with a  cheiloplasty.  The defect was undermined extensively.  Additional obicularis oris muscle was excised with a 15 blade scalpel.  The defect was converted into a full thickness wedge, of less than 50% of the vertical height of the lip, to facilite a better cosmetic result.  Small vessels were then tied off with 5-0 monocyrl. The obicularis oris, superficial fascia, adipose and dermis were then reapproximated.  After the deeper layers were approximated the epidermis was reapproximated with particular care given to realign the vermilion border. Cheiloplasty (Complex) Text: A decision was made to reconstruct the defect with a  cheiloplasty.  The defect was undermined extensively.  Additional obicularis oris muscle was excised with a 15 blade scalpel.  The defect was converted into a full thickness wedge to facilite a better cosmetic result.  Small vessels were then tied off with 5-0 monocyrl. The obicularis oris, superficial fascia, adipose and dermis were then reapproximated.  After the deeper layers were approximated the epidermis was reapproximated with particular care given to realign the vermilion border. Ear Wedge Repair Text: A wedge excision was completed by carrying down an excision through the full thickness of the ear and cartilage with an inward facing Burow's triangle. The wound was then closed in a layered fashion. Full Thickness Lip Wedge Repair (Flap) Text: Given the location of the defect and the proximity to free margins a full thickness wedge repair was deemed most appropriate.  Using a sterile surgical marker, the appropriate repair was drawn incorporating the defect and placing the expected incisions perpendicular to the vermilion border.  The vermilion border was also meticulously outlined to ensure appropriate reapproximation during the repair.  The area thus outlined was incised through and through with a #15 scalpel blade.  The muscularis and dermis were reaproximated with deep sutures following hemostasis. Care was taken to realign the vermilion border before proceeding with the superficial closure.  Once the vermilion was realigned the superfical and mucosal closure was finished. Ftsg Text: The defect edges were debeveled with a #15 scalpel blade.  Given the location of the defect, shape of the defect and the proximity to free margins a full thickness skin graft was deemed most appropriate.  Using a sterile surgical marker, the primary defect shape was transferred to the donor site. The area thus outlined was incised deep to adipose tissue with a #15 scalpel blade.  The harvested graft was then trimmed of adipose tissue until only dermis and epidermis was left.  The skin margins of the secondary defect were undermined to an appropriate distance in all directions utilizing iris scissors.  The secondary defect was closed with interrupted buried subcutaneous sutures.  The skin edges were then re-apposed with running  sutures.  The skin graft was then placed in the primary defect and oriented appropriately. Split-Thickness Skin Graft Text: The defect edges were debeveled with a #15 scalpel blade.  Given the location of the defect, shape of the defect and the proximity to free margins a split thickness skin graft was deemed most appropriate.  Using a sterile surgical marker, the primary defect shape was transferred to the donor site. The split thickness graft was then harvested.  The skin graft was then placed in the primary defect and oriented appropriately. Burow's Graft Text: The defect edges were debeveled with a #15 scalpel blade.  Given the location of the defect, shape of the defect, the proximity to free margins and the presence of a standing cone deformity a Burow's skin graft was deemed most appropriate. The standing cone was removed and this tissue was then trimmed to the shape of the primary defect. The adipose tissue was also removed until only dermis and epidermis were left.  The skin margins of the secondary defect were undermined to an appropriate distance in all directions utilizing iris scissors.  The secondary defect was closed with interrupted buried subcutaneous sutures.  The skin edges were then re-apposed with running  sutures.  The skin graft was then placed in the primary defect and oriented appropriately. Cartilage Graft Text: The defect edges were debeveled with a #15 scalpel blade.  Given the location of the defect, shape of the defect, the fact the defect involved a full thickness cartilage defect a cartilage graft was deemed most appropriate.  An appropriate donor site was identified, cleansed, and anesthetized. The cartilage graft was then harvested and transferred to the recipient site, oriented appropriately and then sutured into place.  The secondary defect was then repaired using a primary closure. Composite Graft Text: The defect edges were debeveled with a #15 scalpel blade.  Given the location of the defect, shape of the defect, the proximity to free margins and the fact the defect was full thickness a composite graft was deemed most appropriate.  The defect was outline and then transferred to the donor site.  A full thickness graft was then excised from the donor site. The graft was then placed in the primary defect, oriented appropriately and then sutured into place.  The secondary defect was then repaired using a primary closure. Epidermal Autograft Text: The defect edges were debeveled with a #15 scalpel blade.  Given the location of the defect, shape of the defect and the proximity to free margins an epidermal autograft was deemed most appropriate.  Using a sterile surgical marker, the primary defect shape was transferred to the donor site. The epidermal graft was then harvested.  The skin graft was then placed in the primary defect and oriented appropriately. Dermal Autograft Text: The defect edges were debeveled with a #15 scalpel blade.  Given the location of the defect, shape of the defect and the proximity to free margins a dermal autograft was deemed most appropriate.  Using a sterile surgical marker, the primary defect shape was transferred to the donor site. The area thus outlined was incised deep to adipose tissue with a #15 scalpel blade.  The harvested graft was then trimmed of adipose and epidermal tissue until only dermis was left.  The skin graft was then placed in the primary defect and oriented appropriately. Skin Substitute Text: The defect edges were debeveled with a #15 scalpel blade.  Given the location of the defect, shape of the defect and the proximity to free margins a skin substitute graft was deemed most appropriate.  The graft material was trimmed to fit the size of the defect. The graft was then placed in the primary defect and oriented appropriately. Skin Substitute Paste Text: The defect edges were debeveled with a #15 scalpel blade.  Given the location of the defect, shape of the defect and the proximity to free margins a skin substitute micronized graft was deemed most appropriate.  The entire vial contents were admixed with 0.5ccs of sterile saline, formed into a paste and then evenly spread over the entire wound bed. Skin Substitute Injection Text: The defect edges were debeveled with a #15 scalpel blade.  Given the location of the defect, shape of the defect and the proximity to free margins a skin substitute micronized graft was deemed most appropriate.  The entire vial contents were admixed with 3.0ccs of sterile saline and then injected subcutaneously throughout the entire wound bed. Tissue Cultured Epidermal Autograft Text: The defect edges were debeveled with a #15 scalpel blade.  Given the location of the defect, shape of the defect and the proximity to free margins a tissue cultured epidermal autograft was deemed most appropriate.  The graft was then trimmed to fit the size of the defect.  The graft was then placed in the primary defect and oriented appropriately. Xenograft Text: The defect edges were debeveled with a #15 scalpel blade.  Given the location of the defect, shape of the defect and the proximity to free margins a xenograft was deemed most appropriate.  The graft was then trimmed to fit the size of the defect.  The graft was then placed in the primary defect and oriented appropriately. Purse String (Simple) Text: Given the location of the defect and the characteristics of the surrounding skin a pursestring closure was deemed most appropriate.  Undermining was performed circumfirentially around the surgical defect.  A purstring suture was then placed and tightened. Purse String (Intermediate) Text: Given the location of the defect and the characteristics of the surrounding skin a pursestring intermediate closure was deemed most appropriate.  Undermining was performed circumfirentially around the surgical defect.  A purstring suture was then placed and tightened. Partial Purse String (Simple) Text: Given the location of the defect and the characteristics of the surrounding skin a simple purse string closure was deemed most appropriate.  Undermining was performed circumfirentially around the surgical defect.  A purse string suture was then placed and tightened. Wound tension only allowed a partial closure of the circular defect. Partial Purse String (Intermediate) Text: Given the location of the defect and the characteristics of the surrounding skin an intermediate purse string closure was deemed most appropriate.  Undermining was performed circumfirentially around the surgical defect.  A purse string suture was then placed and tightened. Wound tension only allowed a partial closure of the circular defect. Localized Dermabrasion Text: The patient was draped in routine manner.  Localized dermabrasion using 3 x 17 mm wire brush was performed in routine manner to papillary dermis. This spot dermabrasion is being performed to complete skin cancer reconstruction. It also will eliminate the other sun damaged precancerous cells that are known to be part of the regional effect of a lifetime's worth of sun exposure. This localized dermabrasion is therapeutic and should not be considered cosmetic in any regard. Tarsorrhaphy Text: A tarsorrhaphy was performed using Frost sutures. Complex Repair And Flap Additional Text (Will Appearing After The Standard Complex Repair Text): The complex repair was not sufficient to completely close the primary defect. The remaining additional defect was repaired with the flap mentioned below. Complex Repair And Graft Additional Text (Will Appearing After The Standard Complex Repair Text): The complex repair was not sufficient to completely close the primary defect. The remaining additional defect was repaired with the graft mentioned below. Cheek Interpolation Flap Division And Inset Text: Division and inset of the cheek interpolation flap was performed to achieve optimal aesthetic result, restore normal anatomic appearance and avoid distortion of normal anatomy, expedite and facilitate wound healing, achieve optimal functional result and because linear closure either not possible or would produce suboptimal result. The patient was prepped and draped in the usual manner. The pedicle was infiltrated with local anesthesia. The pedicle was sectioned with a #15 blade. The pedicle was de-bulked and trimmed to match the shape of the defect. Hemostasis was achieved. The flap donor site and free margin of the flap were secured with deep buried sutures and the wound edges were re-approximated. Cheek To Nose Interpolation Flap Division And Inset Text: Division and inset of the cheek to nose interpolation flap was performed to achieve optimal aesthetic result, restore normal anatomic appearance and avoid distortion of normal anatomy, expedite and facilitate wound healing, achieve optimal functional result and because linear closure either not possible or would produce suboptimal result. The patient was prepped and draped in the usual manner. The pedicle was infiltrated with local anesthesia. The pedicle was sectioned with a #15 blade. The pedicle was de-bulked and trimmed to match the shape of the defect. Hemostasis was achieved. The flap donor site and free margin of the flap were secured with deep buried sutures and the wound edges were re-approximated. Melolabial Interpolation Flap Division And Inset Text: Division and inset of the melolabial interpolation flap was performed to achieve optimal aesthetic result, restore normal anatomic appearance and avoid distortion of normal anatomy, expedite and facilitate wound healing, achieve optimal functional result and because linear closure either not possible or would produce suboptimal result. The patient was prepped and draped in the usual manner. The pedicle was infiltrated with local anesthesia. The pedicle was sectioned with a #15 blade. The pedicle was de-bulked and trimmed to match the shape of the defect. Hemostasis was achieved. The flap donor site and free margin of the flap were secured with deep buried sutures and the wound edges were re-approximated. Mastoid Interpolation Flap Division And Inset Text: Division and inset of the mastoid interpolation flap was performed to achieve optimal aesthetic result, restore normal anatomic appearance and avoid distortion of normal anatomy, expedite and facilitate wound healing, achieve optimal functional result and because linear closure either not possible or would produce suboptimal result. The patient was prepped and draped in the usual manner. The pedicle was infiltrated with local anesthesia. The pedicle was sectioned with a #15 blade. The pedicle was de-bulked and trimmed to match the shape of the defect. Hemostasis was achieved. The flap donor site and free margin of the flap were secured with deep buried sutures and the wound edges were re-approximated. Paramedian Forehead Flap Division And Inset Text: Division and inset of the paramedian forehead flap was performed to achieve optimal aesthetic result, restore normal anatomic appearance and avoid distortion of normal anatomy, expedite and facilitate wound healing, achieve optimal functional result and because linear closure either not possible or would produce suboptimal result. The patient was prepped and draped in the usual manner. The pedicle was infiltrated with local anesthesia. The pedicle was sectioned with a #15 blade. The pedicle was de-bulked and trimmed to match the shape of the defect. Hemostasis was achieved. The flap donor site and free margin of the flap were secured with deep buried sutures and the wound edges were re-approximated. Posterior Auricular Interpolation Flap Division And Inset Text: Division and inset of the posterior auricular interpolation flap was performed to achieve optimal aesthetic result, restore normal anatomic appearance and avoid distortion of normal anatomy, expedite and facilitate wound healing, achieve optimal functional result and because linear closure either not possible or would produce suboptimal result. The patient was prepped and draped in the usual manner. The pedicle was infiltrated with local anesthesia. The pedicle was sectioned with a #15 blade. The pedicle was de-bulked and trimmed to match the shape of the defect. Hemostasis was achieved. The flap donor site and free margin of the flap were secured with deep buried sutures and the wound edges were re-approximated. Manual Repair Warning Statement: We plan on removing the manually selected variable below in favor of our much easier automatic structured text blocks found in the previous tab. We decided to do this to help make the flow better and give you the full power of structured data. Manual selection is never going to be ideal in our platform and I would encourage you to avoid using manual selection from this point on, especially since I will be sunsetting this feature. It is important that you do one of two things with the customized text below. First, you can save all of the text in a word file so you can have it for future reference. Second, transfer the text to the appropriate area in the Library tab. Lastly, if there is a flap or graft type which we do not have you need to let us know right away so I can add it in before the variable is hidden. No need to panic, we plan to give you roughly 6 months to make the change. Consent: The rationale for Repairs was explained to the patient and consent was obtained. The risks, benefits and alternatives to therapy were discussed in detail. Specifically, the risks of infection, scarring, bleeding, prolonged wound healing, incomplete removal, allergy to anesthesia, nerve injury and recurrence were addressed. Prior to the procedure, the treatment site was clearly identified and confirmed by the patient. All components of Universal Protocol/PAUSE Rule completed. Post-Care Instructions: I reviewed with the patient in detail post-care instructions. Patient is not to engage in any heavy lifting, exercise, or swimming for the next 14 days. Should the patient develop any fevers, chills, bleeding, severe pain patient will contact the office immediately. Pain Refusal Text: I offered to prescribe pain medication but the patient refused to take this medication. Where Do You Want The Question To Include Opioid Counseling Located?: Case Summary Tab Information: Selecting Yes will display possible errors in your note based on the variables you have selected. This validation is only offered as a suggestion for you. PLEASE NOTE THAT THE VALIDATION TEXT WILL BE REMOVED WHEN YOU FINALIZE YOUR NOTE. IF YOU WANT TO FAX A PRELIMINARY NOTE YOU WILL NEED TO TOGGLE THIS TO 'NO' IF YOU DO NOT WANT IT IN YOUR FAXED NOTE.

## 2023-01-09 ENCOUNTER — APPOINTMENT (OUTPATIENT)
Dept: CARDIOLOGY | Facility: CLINIC | Age: 78
End: 2023-01-09
Payer: MEDICARE

## 2023-01-09 ENCOUNTER — NON-APPOINTMENT (OUTPATIENT)
Age: 78
End: 2023-01-09

## 2023-01-09 VITALS
SYSTOLIC BLOOD PRESSURE: 120 MMHG | BODY MASS INDEX: 23.92 KG/M2 | HEIGHT: 62 IN | WEIGHT: 130 LBS | DIASTOLIC BLOOD PRESSURE: 72 MMHG | HEART RATE: 76 BPM | OXYGEN SATURATION: 98 %

## 2023-01-09 PROCEDURE — 93000 ELECTROCARDIOGRAM COMPLETE: CPT

## 2023-01-09 PROCEDURE — 99215 OFFICE O/P EST HI 40 MIN: CPT | Mod: 25

## 2023-06-12 ENCOUNTER — APPOINTMENT (OUTPATIENT)
Dept: CARDIOLOGY | Facility: CLINIC | Age: 78
End: 2023-06-12
Payer: MEDICARE

## 2023-06-12 ENCOUNTER — NON-APPOINTMENT (OUTPATIENT)
Age: 78
End: 2023-06-12

## 2023-06-12 ENCOUNTER — APPOINTMENT (OUTPATIENT)
Dept: CARDIOLOGY | Facility: CLINIC | Age: 78
End: 2023-06-12

## 2023-06-12 VITALS
WEIGHT: 130 LBS | DIASTOLIC BLOOD PRESSURE: 72 MMHG | OXYGEN SATURATION: 99 % | HEART RATE: 70 BPM | BODY MASS INDEX: 23.92 KG/M2 | HEIGHT: 62 IN | SYSTOLIC BLOOD PRESSURE: 124 MMHG

## 2023-06-12 PROCEDURE — 93000 ELECTROCARDIOGRAM COMPLETE: CPT

## 2023-06-12 PROCEDURE — 99215 OFFICE O/P EST HI 40 MIN: CPT | Mod: 25

## 2023-06-13 RX ORDER — ICOSAPENT ETHYL 1000 MG/1
1 CAPSULE ORAL TWICE DAILY
Qty: 360 | Refills: 3 | Status: ACTIVE | COMMUNITY
Start: 2023-06-12 | End: 1900-01-01

## 2023-07-03 NOTE — PATIENT PROFILE ADULT - NSPROGENSOURCEINFO_GEN_A_NUR
[facility] Scheduling    Please call [phone] to schedule your image(s) (select option#1).    patient

## 2023-12-13 PROBLEM — E78.5 HYPERLIPIDEMIA, MILD: Status: ACTIVE | Noted: 2019-06-25

## 2023-12-18 ENCOUNTER — APPOINTMENT (OUTPATIENT)
Dept: CARDIOLOGY | Facility: CLINIC | Age: 78
End: 2023-12-18
Payer: MEDICARE

## 2023-12-18 ENCOUNTER — NON-APPOINTMENT (OUTPATIENT)
Age: 78
End: 2023-12-18

## 2023-12-18 VITALS
WEIGHT: 129 LBS | SYSTOLIC BLOOD PRESSURE: 129 MMHG | BODY MASS INDEX: 23.74 KG/M2 | OXYGEN SATURATION: 97 % | HEART RATE: 70 BPM | DIASTOLIC BLOOD PRESSURE: 82 MMHG | RESPIRATION RATE: 18 BRPM | HEIGHT: 62 IN

## 2023-12-18 DIAGNOSIS — E78.5 HYPERLIPIDEMIA, UNSPECIFIED: ICD-10-CM

## 2023-12-18 PROCEDURE — 93306 TTE W/DOPPLER COMPLETE: CPT

## 2023-12-18 PROCEDURE — 99215 OFFICE O/P EST HI 40 MIN: CPT | Mod: 25

## 2023-12-18 PROCEDURE — 93000 ELECTROCARDIOGRAM COMPLETE: CPT

## 2024-05-09 NOTE — ED ADULT TRIAGE NOTE - NS ED NURSE BANDS TYPE
Introduced my self and provided explanation of CM role to patient.  Patient is awake, alert, and aware of current diagnosis and treatment plan including nephrology consult, serial BP checks, serial labs, medication titration.  She voices she resides at home with her spouse and completes her adl's with independence. She states she still regularly plays tennis.  Patient is established with a pcp and denies any issue with retail pharmaceutical coverage.  Patient verbalizes no concerns and identifies no areas to focus on nor barriers to discharge.   Neo Low, MSN RN  [facility] Case Management  [phone]        
Name band;

## 2024-06-06 PROBLEM — I25.10 CAD S/P PERCUTANEOUS CORONARY ANGIOPLASTY: Status: ACTIVE | Noted: 2019-06-25

## 2024-06-06 PROBLEM — I10 BENIGN ESSENTIAL HYPERTENSION: Status: ACTIVE | Noted: 2019-06-25

## 2024-06-11 ENCOUNTER — NON-APPOINTMENT (OUTPATIENT)
Age: 79
End: 2024-06-11

## 2024-06-11 ENCOUNTER — APPOINTMENT (OUTPATIENT)
Dept: CARDIOLOGY | Facility: CLINIC | Age: 79
End: 2024-06-11
Payer: MEDICARE

## 2024-06-11 VITALS
SYSTOLIC BLOOD PRESSURE: 158 MMHG | WEIGHT: 132 LBS | BODY MASS INDEX: 24.29 KG/M2 | HEART RATE: 97 BPM | DIASTOLIC BLOOD PRESSURE: 69 MMHG | RESPIRATION RATE: 16 BRPM | OXYGEN SATURATION: 97 % | HEIGHT: 62 IN

## 2024-06-11 DIAGNOSIS — Z98.61 ATHEROSCLEROTIC HEART DISEASE OF NATIVE CORONARY ARTERY W/OUT ANGINA PECTORIS: ICD-10-CM

## 2024-06-11 DIAGNOSIS — I25.10 ATHEROSCLEROTIC HEART DISEASE OF NATIVE CORONARY ARTERY W/OUT ANGINA PECTORIS: ICD-10-CM

## 2024-06-11 DIAGNOSIS — I10 ESSENTIAL (PRIMARY) HYPERTENSION: ICD-10-CM

## 2024-06-11 PROCEDURE — 93000 ELECTROCARDIOGRAM COMPLETE: CPT

## 2024-06-11 PROCEDURE — G2211 COMPLEX E/M VISIT ADD ON: CPT

## 2024-06-11 PROCEDURE — 99215 OFFICE O/P EST HI 40 MIN: CPT

## 2024-06-11 RX ORDER — ROSUVASTATIN CALCIUM 10 MG/1
10 TABLET, FILM COATED ORAL
Qty: 36 | Refills: 2 | Status: ACTIVE | COMMUNITY
Start: 2024-06-11 | End: 1900-01-01

## 2024-11-04 ENCOUNTER — APPOINTMENT (OUTPATIENT)
Dept: CARDIOLOGY | Facility: CLINIC | Age: 79
End: 2024-11-04
Payer: MEDICARE

## 2024-11-04 VITALS
HEART RATE: 70 BPM | SYSTOLIC BLOOD PRESSURE: 140 MMHG | HEIGHT: 62 IN | BODY MASS INDEX: 23.19 KG/M2 | WEIGHT: 126 LBS | DIASTOLIC BLOOD PRESSURE: 66 MMHG | OXYGEN SATURATION: 99 %

## 2024-11-04 DIAGNOSIS — I10 ESSENTIAL (PRIMARY) HYPERTENSION: ICD-10-CM

## 2024-11-04 DIAGNOSIS — I25.10 ATHEROSCLEROTIC HEART DISEASE OF NATIVE CORONARY ARTERY W/OUT ANGINA PECTORIS: ICD-10-CM

## 2024-11-04 DIAGNOSIS — R06.09 OTHER FORMS OF DYSPNEA: ICD-10-CM

## 2024-11-04 DIAGNOSIS — Z98.61 ATHEROSCLEROTIC HEART DISEASE OF NATIVE CORONARY ARTERY W/OUT ANGINA PECTORIS: ICD-10-CM

## 2024-11-04 PROCEDURE — 99215 OFFICE O/P EST HI 40 MIN: CPT

## 2024-11-04 PROCEDURE — 93306 TTE W/DOPPLER COMPLETE: CPT

## 2024-11-04 PROCEDURE — G2211 COMPLEX E/M VISIT ADD ON: CPT

## 2025-03-03 ENCOUNTER — APPOINTMENT (OUTPATIENT)
Dept: CARDIOLOGY | Facility: CLINIC | Age: 80
End: 2025-03-03

## 2025-03-13 ENCOUNTER — APPOINTMENT (OUTPATIENT)
Dept: CARDIOLOGY | Facility: CLINIC | Age: 80
End: 2025-03-13
Payer: MEDICARE

## 2025-03-13 ENCOUNTER — NON-APPOINTMENT (OUTPATIENT)
Age: 80
End: 2025-03-13

## 2025-03-13 DIAGNOSIS — I10 ESSENTIAL (PRIMARY) HYPERTENSION: ICD-10-CM

## 2025-03-13 DIAGNOSIS — Z98.61 ATHEROSCLEROTIC HEART DISEASE OF NATIVE CORONARY ARTERY W/OUT ANGINA PECTORIS: ICD-10-CM

## 2025-03-13 DIAGNOSIS — I25.10 ATHEROSCLEROTIC HEART DISEASE OF NATIVE CORONARY ARTERY W/OUT ANGINA PECTORIS: ICD-10-CM

## 2025-03-13 DIAGNOSIS — E78.5 HYPERLIPIDEMIA, UNSPECIFIED: ICD-10-CM

## 2025-03-13 PROCEDURE — G2211 COMPLEX E/M VISIT ADD ON: CPT

## 2025-03-13 PROCEDURE — 99214 OFFICE O/P EST MOD 30 MIN: CPT

## 2025-03-13 PROCEDURE — 93000 ELECTROCARDIOGRAM COMPLETE: CPT

## 2025-03-17 ENCOUNTER — APPOINTMENT (OUTPATIENT)
Dept: CARDIOLOGY | Facility: CLINIC | Age: 80
End: 2025-03-17
Payer: MEDICARE

## 2025-05-03 NOTE — DISCHARGE NOTE PROVIDER - NSDCCPGOAL_GEN_ALL_CORE_FT
To get better and follow your care plan as instructed.
Bed/Stretcher in lowest position, wheels locked, appropriate side rails in place/Call bell, personal items and telephone in reach/Instruct patient to call for assistance before getting out of bed/chair/stretcher/Non-slip footwear applied when patient is off stretcher/Flippin to call system/Physically safe environment - no spills, clutter or unnecessary equipment/Purposeful proactive rounding/Room/bathroom lighting operational, light cord in reach

## 2025-08-22 ENCOUNTER — INPATIENT (INPATIENT)
Facility: HOSPITAL | Age: 80
LOS: 3 days | Discharge: HOME | DRG: 312 | End: 2025-08-26
Attending: HOSPITALIST | Admitting: STUDENT IN AN ORGANIZED HEALTH CARE EDUCATION/TRAINING PROGRAM
Payer: MEDICARE

## 2025-08-22 VITALS
TEMPERATURE: 98 F | DIASTOLIC BLOOD PRESSURE: 75 MMHG | SYSTOLIC BLOOD PRESSURE: 169 MMHG | HEART RATE: 69 BPM | OXYGEN SATURATION: 99 % | RESPIRATION RATE: 17 BRPM | WEIGHT: 126.1 LBS | HEIGHT: 62 IN

## 2025-08-22 DIAGNOSIS — Z98.890 OTHER SPECIFIED POSTPROCEDURAL STATES: Chronic | ICD-10-CM

## 2025-08-22 LAB
ALBUMIN SERPL ELPH-MCNC: 4.8 G/DL — SIGNIFICANT CHANGE UP (ref 3.3–5)
ALP SERPL-CCNC: 102 U/L — SIGNIFICANT CHANGE UP (ref 40–120)
ALT FLD-CCNC: 21 U/L — SIGNIFICANT CHANGE UP (ref 10–45)
ANION GAP SERPL CALC-SCNC: 19 MMOL/L — HIGH (ref 5–17)
APTT BLD: 31.9 SEC — SIGNIFICANT CHANGE UP (ref 26.1–36.8)
AST SERPL-CCNC: 27 U/L — SIGNIFICANT CHANGE UP (ref 10–40)
BASOPHILS # BLD AUTO: 0.06 K/UL — SIGNIFICANT CHANGE UP (ref 0–0.2)
BASOPHILS NFR BLD AUTO: 0.7 % — SIGNIFICANT CHANGE UP (ref 0–2)
BILIRUB SERPL-MCNC: 0.6 MG/DL — SIGNIFICANT CHANGE UP (ref 0.2–1.2)
BUN SERPL-MCNC: 12 MG/DL — SIGNIFICANT CHANGE UP (ref 7–23)
CALCIUM SERPL-MCNC: 10.6 MG/DL — HIGH (ref 8.4–10.5)
CHLORIDE SERPL-SCNC: 102 MMOL/L — SIGNIFICANT CHANGE UP (ref 96–108)
CO2 SERPL-SCNC: 18 MMOL/L — LOW (ref 22–31)
CREAT SERPL-MCNC: 0.78 MG/DL — SIGNIFICANT CHANGE UP (ref 0.5–1.3)
EGFR: 77 ML/MIN/1.73M2 — SIGNIFICANT CHANGE UP
EGFR: 77 ML/MIN/1.73M2 — SIGNIFICANT CHANGE UP
EOSINOPHIL # BLD AUTO: 0.08 K/UL — SIGNIFICANT CHANGE UP (ref 0–0.5)
EOSINOPHIL NFR BLD AUTO: 0.9 % — SIGNIFICANT CHANGE UP (ref 0–6)
GAS PNL BLDV: SIGNIFICANT CHANGE UP
GLUCOSE SERPL-MCNC: 108 MG/DL — HIGH (ref 70–99)
HCT VFR BLD CALC: 46.3 % — HIGH (ref 34.5–45)
HGB BLD-MCNC: 14.7 G/DL — SIGNIFICANT CHANGE UP (ref 11.5–15.5)
IMM GRANULOCYTES # BLD AUTO: 0.04 K/UL — SIGNIFICANT CHANGE UP (ref 0–0.07)
IMM GRANULOCYTES NFR BLD AUTO: 0.5 % — SIGNIFICANT CHANGE UP (ref 0–0.9)
INR BLD: 1.01 RATIO — SIGNIFICANT CHANGE UP (ref 0.85–1.16)
LYMPHOCYTES # BLD AUTO: 1.48 K/UL — SIGNIFICANT CHANGE UP (ref 1–3.3)
LYMPHOCYTES NFR BLD AUTO: 16.9 % — SIGNIFICANT CHANGE UP (ref 13–44)
MAGNESIUM SERPL-MCNC: 2.2 MG/DL — SIGNIFICANT CHANGE UP (ref 1.6–2.6)
MCHC RBC-ENTMCNC: 29.1 PG — SIGNIFICANT CHANGE UP (ref 27–34)
MCHC RBC-ENTMCNC: 31.7 G/DL — LOW (ref 32–36)
MCV RBC AUTO: 91.7 FL — SIGNIFICANT CHANGE UP (ref 80–100)
MONOCYTES # BLD AUTO: 0.32 K/UL — SIGNIFICANT CHANGE UP (ref 0–0.9)
MONOCYTES NFR BLD AUTO: 3.6 % — SIGNIFICANT CHANGE UP (ref 2–14)
NEUTROPHILS # BLD AUTO: 6.79 K/UL — SIGNIFICANT CHANGE UP (ref 1.8–7.4)
NEUTROPHILS NFR BLD AUTO: 77.4 % — HIGH (ref 43–77)
NRBC # BLD AUTO: 0 K/UL — SIGNIFICANT CHANGE UP (ref 0–0)
NRBC # FLD: 0 K/UL — SIGNIFICANT CHANGE UP (ref 0–0)
NRBC BLD AUTO-RTO: 0 /100 WBCS — SIGNIFICANT CHANGE UP (ref 0–0)
NT-PROBNP SERPL-SCNC: 68 PG/ML — SIGNIFICANT CHANGE UP (ref 0–300)
PLATELET # BLD AUTO: 193 K/UL — SIGNIFICANT CHANGE UP (ref 150–400)
PMV BLD: 9.8 FL — SIGNIFICANT CHANGE UP (ref 7–13)
POTASSIUM SERPL-MCNC: 3.7 MMOL/L — SIGNIFICANT CHANGE UP (ref 3.5–5.3)
POTASSIUM SERPL-SCNC: 3.7 MMOL/L — SIGNIFICANT CHANGE UP (ref 3.5–5.3)
PROT SERPL-MCNC: 7.9 G/DL — SIGNIFICANT CHANGE UP (ref 6–8.3)
PROTHROM AB SERPL-ACNC: 11.7 SEC — SIGNIFICANT CHANGE UP (ref 9.9–13.4)
RBC # BLD: 5.05 M/UL — SIGNIFICANT CHANGE UP (ref 3.8–5.2)
RBC # FLD: 13.7 % — SIGNIFICANT CHANGE UP (ref 10.3–14.5)
SODIUM SERPL-SCNC: 139 MMOL/L — SIGNIFICANT CHANGE UP (ref 135–145)
TROPONIN T, HIGH SENSITIVITY RESULT: 9 NG/L — SIGNIFICANT CHANGE UP
WBC # BLD: 8.77 K/UL — SIGNIFICANT CHANGE UP (ref 3.8–10.5)
WBC # FLD AUTO: 8.77 K/UL — SIGNIFICANT CHANGE UP (ref 3.8–10.5)

## 2025-08-22 PROCEDURE — 99285 EMERGENCY DEPT VISIT HI MDM: CPT

## 2025-08-22 PROCEDURE — 71275 CT ANGIOGRAPHY CHEST: CPT | Mod: 26

## 2025-08-22 PROCEDURE — 74174 CTA ABD&PLVS W/CONTRAST: CPT | Mod: 26

## 2025-08-23 DIAGNOSIS — R55 SYNCOPE AND COLLAPSE: ICD-10-CM

## 2025-08-23 DIAGNOSIS — I10 ESSENTIAL (PRIMARY) HYPERTENSION: ICD-10-CM

## 2025-08-23 DIAGNOSIS — E78.5 HYPERLIPIDEMIA, UNSPECIFIED: ICD-10-CM

## 2025-08-23 DIAGNOSIS — I25.10 ATHEROSCLEROTIC HEART DISEASE OF NATIVE CORONARY ARTERY WITHOUT ANGINA PECTORIS: ICD-10-CM

## 2025-08-23 DIAGNOSIS — R91.8 OTHER NONSPECIFIC ABNORMAL FINDING OF LUNG FIELD: ICD-10-CM

## 2025-08-23 DIAGNOSIS — Z29.9 ENCOUNTER FOR PROPHYLACTIC MEASURES, UNSPECIFIED: ICD-10-CM

## 2025-08-23 LAB
A1C WITH ESTIMATED AVERAGE GLUCOSE RESULT: 6.5 % — HIGH (ref 4–5.6)
ADD ON TEST-SPECIMEN IN LAB: SIGNIFICANT CHANGE UP
ADD ON TEST-SPECIMEN IN LAB: SIGNIFICANT CHANGE UP
ALBUMIN SERPL ELPH-MCNC: 4.8 G/DL — SIGNIFICANT CHANGE UP (ref 3.3–5)
ALP SERPL-CCNC: 95 U/L — SIGNIFICANT CHANGE UP (ref 40–120)
ALT FLD-CCNC: 19 U/L — SIGNIFICANT CHANGE UP (ref 10–45)
ANION GAP SERPL CALC-SCNC: 16 MMOL/L — SIGNIFICANT CHANGE UP (ref 5–17)
AST SERPL-CCNC: 25 U/L — SIGNIFICANT CHANGE UP (ref 10–40)
BILIRUB SERPL-MCNC: 0.7 MG/DL — SIGNIFICANT CHANGE UP (ref 0.2–1.2)
BUN SERPL-MCNC: 9 MG/DL — SIGNIFICANT CHANGE UP (ref 7–23)
CALCIUM SERPL-MCNC: 10.1 MG/DL — SIGNIFICANT CHANGE UP (ref 8.4–10.5)
CHLORIDE SERPL-SCNC: 103 MMOL/L — SIGNIFICANT CHANGE UP (ref 96–108)
CO2 SERPL-SCNC: 22 MMOL/L — SIGNIFICANT CHANGE UP (ref 22–31)
CREAT SERPL-MCNC: 0.67 MG/DL — SIGNIFICANT CHANGE UP (ref 0.5–1.3)
EGFR: 88 ML/MIN/1.73M2 — SIGNIFICANT CHANGE UP
EGFR: 88 ML/MIN/1.73M2 — SIGNIFICANT CHANGE UP
ESTIMATED AVERAGE GLUCOSE: 140 MG/DL — HIGH (ref 68–114)
GLUCOSE SERPL-MCNC: 109 MG/DL — HIGH (ref 70–99)
MAGNESIUM SERPL-MCNC: 2.2 MG/DL — SIGNIFICANT CHANGE UP (ref 1.6–2.6)
PHOSPHATE SERPL-MCNC: 3.4 MG/DL — SIGNIFICANT CHANGE UP (ref 2.5–4.5)
POTASSIUM SERPL-MCNC: 3.5 MMOL/L — SIGNIFICANT CHANGE UP (ref 3.5–5.3)
POTASSIUM SERPL-SCNC: 3.5 MMOL/L — SIGNIFICANT CHANGE UP (ref 3.5–5.3)
PROT SERPL-MCNC: 7.9 G/DL — SIGNIFICANT CHANGE UP (ref 6–8.3)
SODIUM SERPL-SCNC: 141 MMOL/L — SIGNIFICANT CHANGE UP (ref 135–145)
T3FREE SERPL-MCNC: 2.86 PG/ML — SIGNIFICANT CHANGE UP (ref 2–4.4)
T4 FREE SERPL-MCNC: 1.4 NG/DL — SIGNIFICANT CHANGE UP (ref 0.9–1.8)
TROPONIN T, HIGH SENSITIVITY RESULT: 10 NG/L — SIGNIFICANT CHANGE UP
TSH SERPL-MCNC: 1.64 UIU/ML — SIGNIFICANT CHANGE UP (ref 0.27–4.2)

## 2025-08-23 PROCEDURE — 83605 ASSAY OF LACTIC ACID: CPT

## 2025-08-23 PROCEDURE — 80053 COMPREHEN METABOLIC PANEL: CPT

## 2025-08-23 PROCEDURE — 85730 THROMBOPLASTIN TIME PARTIAL: CPT

## 2025-08-23 PROCEDURE — 93880 EXTRACRANIAL BILAT STUDY: CPT | Mod: 26

## 2025-08-23 PROCEDURE — 36415 COLL VENOUS BLD VENIPUNCTURE: CPT

## 2025-08-23 PROCEDURE — 84481 FREE ASSAY (FT-3): CPT

## 2025-08-23 PROCEDURE — 85610 PROTHROMBIN TIME: CPT

## 2025-08-23 PROCEDURE — 93010 ELECTROCARDIOGRAM REPORT: CPT

## 2025-08-23 PROCEDURE — 99223 1ST HOSP IP/OBS HIGH 75: CPT

## 2025-08-23 PROCEDURE — 84484 ASSAY OF TROPONIN QUANT: CPT

## 2025-08-23 PROCEDURE — 82330 ASSAY OF CALCIUM: CPT

## 2025-08-23 PROCEDURE — 74174 CTA ABD&PLVS W/CONTRAST: CPT

## 2025-08-23 PROCEDURE — 82947 ASSAY GLUCOSE BLOOD QUANT: CPT

## 2025-08-23 PROCEDURE — 84295 ASSAY OF SERUM SODIUM: CPT

## 2025-08-23 PROCEDURE — 93880 EXTRACRANIAL BILAT STUDY: CPT

## 2025-08-23 PROCEDURE — 83880 ASSAY OF NATRIURETIC PEPTIDE: CPT

## 2025-08-23 PROCEDURE — 82803 BLOOD GASES ANY COMBINATION: CPT

## 2025-08-23 PROCEDURE — 84439 ASSAY OF FREE THYROXINE: CPT

## 2025-08-23 PROCEDURE — 84132 ASSAY OF SERUM POTASSIUM: CPT

## 2025-08-23 PROCEDURE — 85025 COMPLETE CBC W/AUTO DIFF WBC: CPT

## 2025-08-23 PROCEDURE — 82962 GLUCOSE BLOOD TEST: CPT

## 2025-08-23 PROCEDURE — 83036 HEMOGLOBIN GLYCOSYLATED A1C: CPT

## 2025-08-23 PROCEDURE — 84443 ASSAY THYROID STIM HORMONE: CPT

## 2025-08-23 PROCEDURE — 83735 ASSAY OF MAGNESIUM: CPT

## 2025-08-23 PROCEDURE — 71275 CT ANGIOGRAPHY CHEST: CPT

## 2025-08-23 PROCEDURE — 82435 ASSAY OF BLOOD CHLORIDE: CPT

## 2025-08-23 PROCEDURE — 85018 HEMOGLOBIN: CPT

## 2025-08-23 PROCEDURE — 85014 HEMATOCRIT: CPT

## 2025-08-23 PROCEDURE — 84100 ASSAY OF PHOSPHORUS: CPT

## 2025-08-23 RX ORDER — AMLODIPINE BESYLATE 10 MG/1
1 TABLET ORAL
Refills: 0 | DISCHARGE

## 2025-08-23 RX ORDER — ICOSAPENT ETHYL 500 MG/1
2 CAPSULE ORAL
Refills: 0 | DISCHARGE

## 2025-08-23 RX ORDER — ASPIRIN 325 MG
81 TABLET ORAL DAILY
Refills: 0 | Status: DISCONTINUED | OUTPATIENT
Start: 2025-08-23 | End: 2025-08-24

## 2025-08-23 RX ORDER — ROSUVASTATIN CALCIUM 20 MG/1
10 TABLET, FILM COATED ORAL
Refills: 0 | Status: DISCONTINUED | OUTPATIENT
Start: 2025-08-23 | End: 2025-08-26

## 2025-08-23 RX ORDER — HEPARIN SODIUM 1000 [USP'U]/ML
5000 INJECTION INTRAVENOUS; SUBCUTANEOUS EVERY 8 HOURS
Refills: 0 | Status: DISCONTINUED | OUTPATIENT
Start: 2025-08-23 | End: 2025-08-26

## 2025-08-23 RX ORDER — ROSUVASTATIN CALCIUM 20 MG/1
1 TABLET, FILM COATED ORAL
Refills: 0 | DISCHARGE

## 2025-08-23 RX ORDER — AMLODIPINE BESYLATE 10 MG/1
5 TABLET ORAL DAILY
Refills: 0 | Status: DISCONTINUED | OUTPATIENT
Start: 2025-08-23 | End: 2025-08-26

## 2025-08-23 RX ADMIN — HEPARIN SODIUM 5000 UNIT(S): 1000 INJECTION INTRAVENOUS; SUBCUTANEOUS at 21:44

## 2025-08-23 RX ADMIN — HEPARIN SODIUM 5000 UNIT(S): 1000 INJECTION INTRAVENOUS; SUBCUTANEOUS at 13:31

## 2025-08-23 RX ADMIN — Medication 81 MILLIGRAM(S): at 22:37

## 2025-08-24 LAB
ANION GAP SERPL CALC-SCNC: 14 MMOL/L — SIGNIFICANT CHANGE UP (ref 5–17)
BUN SERPL-MCNC: 17 MG/DL — SIGNIFICANT CHANGE UP (ref 7–23)
CALCIUM SERPL-MCNC: 9.9 MG/DL — SIGNIFICANT CHANGE UP (ref 8.4–10.5)
CHLORIDE SERPL-SCNC: 105 MMOL/L — SIGNIFICANT CHANGE UP (ref 96–108)
CO2 SERPL-SCNC: 21 MMOL/L — LOW (ref 22–31)
CREAT SERPL-MCNC: 0.79 MG/DL — SIGNIFICANT CHANGE UP (ref 0.5–1.3)
EGFR: 76 ML/MIN/1.73M2 — SIGNIFICANT CHANGE UP
EGFR: 76 ML/MIN/1.73M2 — SIGNIFICANT CHANGE UP
GLUCOSE SERPL-MCNC: 99 MG/DL — SIGNIFICANT CHANGE UP (ref 70–99)
HCT VFR BLD CALC: 43.5 % — SIGNIFICANT CHANGE UP (ref 34.5–45)
HGB BLD-MCNC: 13.9 G/DL — SIGNIFICANT CHANGE UP (ref 11.5–15.5)
MAGNESIUM SERPL-MCNC: 2.3 MG/DL — SIGNIFICANT CHANGE UP (ref 1.6–2.6)
MCHC RBC-ENTMCNC: 28.8 PG — SIGNIFICANT CHANGE UP (ref 27–34)
MCHC RBC-ENTMCNC: 32 G/DL — SIGNIFICANT CHANGE UP (ref 32–36)
MCV RBC AUTO: 90.2 FL — SIGNIFICANT CHANGE UP (ref 80–100)
NRBC # BLD AUTO: 0 K/UL — SIGNIFICANT CHANGE UP (ref 0–0)
NRBC # FLD: 0 K/UL — SIGNIFICANT CHANGE UP (ref 0–0)
NRBC BLD AUTO-RTO: 0 /100 WBCS — SIGNIFICANT CHANGE UP (ref 0–0)
PLATELET # BLD AUTO: 178 K/UL — SIGNIFICANT CHANGE UP (ref 150–400)
PMV BLD: 10.7 FL — SIGNIFICANT CHANGE UP (ref 7–13)
POTASSIUM SERPL-MCNC: 3.8 MMOL/L — SIGNIFICANT CHANGE UP (ref 3.5–5.3)
POTASSIUM SERPL-SCNC: 3.8 MMOL/L — SIGNIFICANT CHANGE UP (ref 3.5–5.3)
RBC # BLD: 4.82 M/UL — SIGNIFICANT CHANGE UP (ref 3.8–5.2)
RBC # FLD: 13.7 % — SIGNIFICANT CHANGE UP (ref 10.3–14.5)
SODIUM SERPL-SCNC: 140 MMOL/L — SIGNIFICANT CHANGE UP (ref 135–145)
T4 FREE+ TSH PNL SERPL: 0.9 UIU/ML — SIGNIFICANT CHANGE UP (ref 0.27–4.2)
WBC # BLD: 5.7 K/UL — SIGNIFICANT CHANGE UP (ref 3.8–10.5)
WBC # FLD AUTO: 5.7 K/UL — SIGNIFICANT CHANGE UP (ref 3.8–10.5)

## 2025-08-24 PROCEDURE — 93010 ELECTROCARDIOGRAM REPORT: CPT

## 2025-08-24 PROCEDURE — 36415 COLL VENOUS BLD VENIPUNCTURE: CPT

## 2025-08-24 PROCEDURE — 74174 CTA ABD&PLVS W/CONTRAST: CPT

## 2025-08-24 PROCEDURE — 85018 HEMOGLOBIN: CPT

## 2025-08-24 PROCEDURE — 99233 SBSQ HOSP IP/OBS HIGH 50: CPT

## 2025-08-24 PROCEDURE — 80053 COMPREHEN METABOLIC PANEL: CPT

## 2025-08-24 PROCEDURE — 84481 FREE ASSAY (FT-3): CPT

## 2025-08-24 PROCEDURE — 84439 ASSAY OF FREE THYROXINE: CPT

## 2025-08-24 PROCEDURE — 99223 1ST HOSP IP/OBS HIGH 75: CPT | Mod: GC

## 2025-08-24 PROCEDURE — 82947 ASSAY GLUCOSE BLOOD QUANT: CPT

## 2025-08-24 PROCEDURE — 84100 ASSAY OF PHOSPHORUS: CPT

## 2025-08-24 PROCEDURE — 83735 ASSAY OF MAGNESIUM: CPT

## 2025-08-24 PROCEDURE — 93880 EXTRACRANIAL BILAT STUDY: CPT

## 2025-08-24 PROCEDURE — 84443 ASSAY THYROID STIM HORMONE: CPT

## 2025-08-24 PROCEDURE — 84484 ASSAY OF TROPONIN QUANT: CPT

## 2025-08-24 PROCEDURE — 82962 GLUCOSE BLOOD TEST: CPT

## 2025-08-24 PROCEDURE — 82803 BLOOD GASES ANY COMBINATION: CPT

## 2025-08-24 PROCEDURE — 85610 PROTHROMBIN TIME: CPT

## 2025-08-24 PROCEDURE — 83605 ASSAY OF LACTIC ACID: CPT

## 2025-08-24 PROCEDURE — 82330 ASSAY OF CALCIUM: CPT

## 2025-08-24 PROCEDURE — 82435 ASSAY OF BLOOD CHLORIDE: CPT

## 2025-08-24 PROCEDURE — 83036 HEMOGLOBIN GLYCOSYLATED A1C: CPT

## 2025-08-24 PROCEDURE — 84295 ASSAY OF SERUM SODIUM: CPT

## 2025-08-24 PROCEDURE — 80048 BASIC METABOLIC PNL TOTAL CA: CPT

## 2025-08-24 PROCEDURE — 71275 CT ANGIOGRAPHY CHEST: CPT

## 2025-08-24 PROCEDURE — 85025 COMPLETE CBC W/AUTO DIFF WBC: CPT

## 2025-08-24 PROCEDURE — 99232 SBSQ HOSP IP/OBS MODERATE 35: CPT

## 2025-08-24 PROCEDURE — 85014 HEMATOCRIT: CPT

## 2025-08-24 PROCEDURE — 83880 ASSAY OF NATRIURETIC PEPTIDE: CPT

## 2025-08-24 PROCEDURE — 85730 THROMBOPLASTIN TIME PARTIAL: CPT

## 2025-08-24 PROCEDURE — 93005 ELECTROCARDIOGRAM TRACING: CPT

## 2025-08-24 PROCEDURE — 85027 COMPLETE CBC AUTOMATED: CPT

## 2025-08-24 PROCEDURE — 84132 ASSAY OF SERUM POTASSIUM: CPT

## 2025-08-24 RX ADMIN — HEPARIN SODIUM 5000 UNIT(S): 1000 INJECTION INTRAVENOUS; SUBCUTANEOUS at 05:16

## 2025-08-24 RX ADMIN — AMLODIPINE BESYLATE 5 MILLIGRAM(S): 10 TABLET ORAL at 05:12

## 2025-08-24 RX ADMIN — HEPARIN SODIUM 5000 UNIT(S): 1000 INJECTION INTRAVENOUS; SUBCUTANEOUS at 13:02

## 2025-08-24 RX ADMIN — HEPARIN SODIUM 5000 UNIT(S): 1000 INJECTION INTRAVENOUS; SUBCUTANEOUS at 21:25

## 2025-08-25 ENCOUNTER — RESULT REVIEW (OUTPATIENT)
Age: 80
End: 2025-08-25

## 2025-08-25 ENCOUNTER — TRANSCRIPTION ENCOUNTER (OUTPATIENT)
Age: 80
End: 2025-08-25

## 2025-08-25 PROCEDURE — 93016 CV STRESS TEST SUPVJ ONLY: CPT

## 2025-08-25 PROCEDURE — 78452 HT MUSCLE IMAGE SPECT MULT: CPT | Mod: 26

## 2025-08-25 PROCEDURE — 93356 MYOCRD STRAIN IMG SPCKL TRCK: CPT

## 2025-08-25 PROCEDURE — 84443 ASSAY THYROID STIM HORMONE: CPT

## 2025-08-25 PROCEDURE — 36415 COLL VENOUS BLD VENIPUNCTURE: CPT

## 2025-08-25 PROCEDURE — 84100 ASSAY OF PHOSPHORUS: CPT

## 2025-08-25 PROCEDURE — 74174 CTA ABD&PLVS W/CONTRAST: CPT

## 2025-08-25 PROCEDURE — 83735 ASSAY OF MAGNESIUM: CPT

## 2025-08-25 PROCEDURE — 82435 ASSAY OF BLOOD CHLORIDE: CPT

## 2025-08-25 PROCEDURE — 99233 SBSQ HOSP IP/OBS HIGH 50: CPT

## 2025-08-25 PROCEDURE — 82803 BLOOD GASES ANY COMBINATION: CPT

## 2025-08-25 PROCEDURE — 82962 GLUCOSE BLOOD TEST: CPT

## 2025-08-25 PROCEDURE — 93880 EXTRACRANIAL BILAT STUDY: CPT

## 2025-08-25 PROCEDURE — 93005 ELECTROCARDIOGRAM TRACING: CPT

## 2025-08-25 PROCEDURE — 82947 ASSAY GLUCOSE BLOOD QUANT: CPT

## 2025-08-25 PROCEDURE — 85025 COMPLETE CBC W/AUTO DIFF WBC: CPT

## 2025-08-25 PROCEDURE — 99232 SBSQ HOSP IP/OBS MODERATE 35: CPT

## 2025-08-25 PROCEDURE — 84484 ASSAY OF TROPONIN QUANT: CPT

## 2025-08-25 PROCEDURE — 80048 BASIC METABOLIC PNL TOTAL CA: CPT

## 2025-08-25 PROCEDURE — 83880 ASSAY OF NATRIURETIC PEPTIDE: CPT

## 2025-08-25 PROCEDURE — 83605 ASSAY OF LACTIC ACID: CPT

## 2025-08-25 PROCEDURE — 76376 3D RENDER W/INTRP POSTPROCES: CPT | Mod: 26,XU

## 2025-08-25 PROCEDURE — 85610 PROTHROMBIN TIME: CPT

## 2025-08-25 PROCEDURE — 93306 TTE W/DOPPLER COMPLETE: CPT | Mod: 26

## 2025-08-25 PROCEDURE — 85018 HEMOGLOBIN: CPT

## 2025-08-25 PROCEDURE — 85014 HEMATOCRIT: CPT

## 2025-08-25 PROCEDURE — 80053 COMPREHEN METABOLIC PANEL: CPT

## 2025-08-25 PROCEDURE — 83036 HEMOGLOBIN GLYCOSYLATED A1C: CPT

## 2025-08-25 PROCEDURE — 84439 ASSAY OF FREE THYROXINE: CPT

## 2025-08-25 PROCEDURE — 85027 COMPLETE CBC AUTOMATED: CPT

## 2025-08-25 PROCEDURE — 97161 PT EVAL LOW COMPLEX 20 MIN: CPT

## 2025-08-25 PROCEDURE — 84132 ASSAY OF SERUM POTASSIUM: CPT

## 2025-08-25 PROCEDURE — 84295 ASSAY OF SERUM SODIUM: CPT

## 2025-08-25 PROCEDURE — 84481 FREE ASSAY (FT-3): CPT

## 2025-08-25 PROCEDURE — 85730 THROMBOPLASTIN TIME PARTIAL: CPT

## 2025-08-25 PROCEDURE — 71275 CT ANGIOGRAPHY CHEST: CPT

## 2025-08-25 PROCEDURE — 82330 ASSAY OF CALCIUM: CPT

## 2025-08-25 PROCEDURE — 93018 CV STRESS TEST I&R ONLY: CPT

## 2025-08-25 RX ORDER — ASPIRIN 325 MG
81 TABLET ORAL DAILY
Refills: 0 | Status: DISCONTINUED | OUTPATIENT
Start: 2025-08-25 | End: 2025-08-26

## 2025-08-25 RX ADMIN — AMLODIPINE BESYLATE 5 MILLIGRAM(S): 10 TABLET ORAL at 05:09

## 2025-08-25 RX ADMIN — HEPARIN SODIUM 5000 UNIT(S): 1000 INJECTION INTRAVENOUS; SUBCUTANEOUS at 05:09

## 2025-08-25 RX ADMIN — HEPARIN SODIUM 5000 UNIT(S): 1000 INJECTION INTRAVENOUS; SUBCUTANEOUS at 21:29

## 2025-08-25 RX ADMIN — ROSUVASTATIN CALCIUM 10 MILLIGRAM(S): 20 TABLET, FILM COATED ORAL at 21:23

## 2025-08-26 ENCOUNTER — TRANSCRIPTION ENCOUNTER (OUTPATIENT)
Age: 80
End: 2025-08-26

## 2025-08-26 VITALS
RESPIRATION RATE: 18 BRPM | OXYGEN SATURATION: 98 % | SYSTOLIC BLOOD PRESSURE: 124 MMHG | TEMPERATURE: 98 F | HEART RATE: 88 BPM | DIASTOLIC BLOOD PRESSURE: 76 MMHG

## 2025-08-26 DIAGNOSIS — I45.5 OTHER SPECIFIED HEART BLOCK: ICD-10-CM

## 2025-08-26 PROBLEM — Z86.79 PERSONAL HISTORY OF OTHER DISEASES OF THE CIRCULATORY SYSTEM: Chronic | Status: ACTIVE | Noted: 2025-08-23

## 2025-08-26 PROCEDURE — 82330 ASSAY OF CALCIUM: CPT

## 2025-08-26 PROCEDURE — 76376 3D RENDER W/INTRP POSTPROCES: CPT

## 2025-08-26 PROCEDURE — 85730 THROMBOPLASTIN TIME PARTIAL: CPT

## 2025-08-26 PROCEDURE — 83036 HEMOGLOBIN GLYCOSYLATED A1C: CPT

## 2025-08-26 PROCEDURE — 84295 ASSAY OF SERUM SODIUM: CPT

## 2025-08-26 PROCEDURE — 93017 CV STRESS TEST TRACING ONLY: CPT

## 2025-08-26 PROCEDURE — 97161 PT EVAL LOW COMPLEX 20 MIN: CPT

## 2025-08-26 PROCEDURE — 82435 ASSAY OF BLOOD CHLORIDE: CPT

## 2025-08-26 PROCEDURE — 93356 MYOCRD STRAIN IMG SPCKL TRCK: CPT

## 2025-08-26 PROCEDURE — 85014 HEMATOCRIT: CPT

## 2025-08-26 PROCEDURE — 85025 COMPLETE CBC W/AUTO DIFF WBC: CPT

## 2025-08-26 PROCEDURE — 82947 ASSAY GLUCOSE BLOOD QUANT: CPT

## 2025-08-26 PROCEDURE — 83605 ASSAY OF LACTIC ACID: CPT

## 2025-08-26 PROCEDURE — 85027 COMPLETE CBC AUTOMATED: CPT

## 2025-08-26 PROCEDURE — 74174 CTA ABD&PLVS W/CONTRAST: CPT

## 2025-08-26 PROCEDURE — 82803 BLOOD GASES ANY COMBINATION: CPT

## 2025-08-26 PROCEDURE — 93005 ELECTROCARDIOGRAM TRACING: CPT

## 2025-08-26 PROCEDURE — 82962 GLUCOSE BLOOD TEST: CPT

## 2025-08-26 PROCEDURE — 99233 SBSQ HOSP IP/OBS HIGH 50: CPT

## 2025-08-26 PROCEDURE — 99285 EMERGENCY DEPT VISIT HI MDM: CPT

## 2025-08-26 PROCEDURE — 80048 BASIC METABOLIC PNL TOTAL CA: CPT

## 2025-08-26 PROCEDURE — 84132 ASSAY OF SERUM POTASSIUM: CPT

## 2025-08-26 PROCEDURE — 99239 HOSP IP/OBS DSCHRG MGMT >30: CPT

## 2025-08-26 PROCEDURE — 85018 HEMOGLOBIN: CPT

## 2025-08-26 PROCEDURE — 93306 TTE W/DOPPLER COMPLETE: CPT

## 2025-08-26 PROCEDURE — A9500: CPT

## 2025-08-26 PROCEDURE — 78452 HT MUSCLE IMAGE SPECT MULT: CPT

## 2025-08-26 PROCEDURE — 93880 EXTRACRANIAL BILAT STUDY: CPT

## 2025-08-26 PROCEDURE — 83735 ASSAY OF MAGNESIUM: CPT

## 2025-08-26 PROCEDURE — 83880 ASSAY OF NATRIURETIC PEPTIDE: CPT

## 2025-08-26 PROCEDURE — 71275 CT ANGIOGRAPHY CHEST: CPT

## 2025-08-26 PROCEDURE — 36415 COLL VENOUS BLD VENIPUNCTURE: CPT

## 2025-08-26 PROCEDURE — 84443 ASSAY THYROID STIM HORMONE: CPT

## 2025-08-26 PROCEDURE — 85610 PROTHROMBIN TIME: CPT

## 2025-08-26 PROCEDURE — 84484 ASSAY OF TROPONIN QUANT: CPT

## 2025-08-26 PROCEDURE — 84100 ASSAY OF PHOSPHORUS: CPT

## 2025-08-26 PROCEDURE — 84439 ASSAY OF FREE THYROXINE: CPT

## 2025-08-26 PROCEDURE — 84481 FREE ASSAY (FT-3): CPT

## 2025-08-26 PROCEDURE — 80053 COMPREHEN METABOLIC PANEL: CPT

## 2025-08-26 RX ADMIN — HEPARIN SODIUM 5000 UNIT(S): 1000 INJECTION INTRAVENOUS; SUBCUTANEOUS at 13:13

## 2025-08-26 RX ADMIN — AMLODIPINE BESYLATE 5 MILLIGRAM(S): 10 TABLET ORAL at 05:42

## 2025-08-26 RX ADMIN — HEPARIN SODIUM 5000 UNIT(S): 1000 INJECTION INTRAVENOUS; SUBCUTANEOUS at 05:44

## 2025-08-26 RX ADMIN — Medication 81 MILLIGRAM(S): at 05:55

## 2025-08-28 ENCOUNTER — NON-APPOINTMENT (OUTPATIENT)
Age: 80
End: 2025-08-28

## 2025-09-04 ENCOUNTER — NON-APPOINTMENT (OUTPATIENT)
Age: 80
End: 2025-09-04

## 2025-09-04 ENCOUNTER — APPOINTMENT (OUTPATIENT)
Dept: CARDIOLOGY | Facility: CLINIC | Age: 80
End: 2025-09-04
Payer: MEDICARE

## 2025-09-04 VITALS
WEIGHT: 124 LBS | HEART RATE: 69 BPM | DIASTOLIC BLOOD PRESSURE: 70 MMHG | SYSTOLIC BLOOD PRESSURE: 125 MMHG | BODY MASS INDEX: 22.68 KG/M2 | OXYGEN SATURATION: 97 %

## 2025-09-04 DIAGNOSIS — I10 ESSENTIAL (PRIMARY) HYPERTENSION: ICD-10-CM

## 2025-09-04 DIAGNOSIS — E78.5 HYPERLIPIDEMIA, UNSPECIFIED: ICD-10-CM

## 2025-09-04 DIAGNOSIS — R55 SYNCOPE AND COLLAPSE: ICD-10-CM

## 2025-09-04 DIAGNOSIS — R06.09 OTHER FORMS OF DYSPNEA: ICD-10-CM

## 2025-09-04 PROCEDURE — G2211 COMPLEX E/M VISIT ADD ON: CPT

## 2025-09-04 PROCEDURE — 99215 OFFICE O/P EST HI 40 MIN: CPT

## 2025-09-04 PROCEDURE — 93000 ELECTROCARDIOGRAM COMPLETE: CPT

## 2025-09-11 ENCOUNTER — APPOINTMENT (OUTPATIENT)
Dept: PULMONOLOGY | Facility: CLINIC | Age: 80
End: 2025-09-11

## 2025-09-11 VITALS
OXYGEN SATURATION: 99 % | SYSTOLIC BLOOD PRESSURE: 167 MMHG | BODY MASS INDEX: 22.45 KG/M2 | WEIGHT: 122 LBS | HEART RATE: 72 BPM | HEIGHT: 62 IN | RESPIRATION RATE: 17 BRPM | DIASTOLIC BLOOD PRESSURE: 72 MMHG

## 2025-09-11 DIAGNOSIS — R91.8 OTHER NONSPECIFIC ABNORMAL FINDING OF LUNG FIELD: ICD-10-CM

## 2025-09-11 LAB
ALBUMIN SERPL ELPH-MCNC: 4.9 G/DL
ALP BLD-CCNC: 80 U/L
ALT SERPL-CCNC: 25 U/L
ANION GAP SERPL CALC-SCNC: 13 MMOL/L
APTT BLD: 34.8 SEC
AST SERPL-CCNC: 32 U/L
BASOPHILS # BLD AUTO: 0.04 K/UL
BASOPHILS NFR BLD AUTO: 0.6 %
BILIRUB SERPL-MCNC: 0.4 MG/DL
BUN SERPL-MCNC: 13 MG/DL
CALCIUM SERPL-MCNC: 9.7 MG/DL
CHLORIDE SERPL-SCNC: 106 MMOL/L
CO2 SERPL-SCNC: 24 MMOL/L
CREAT SERPL-MCNC: 0.75 MG/DL
EGFRCR SERPLBLD CKD-EPI 2021: 80 ML/MIN/1.73M2
EOSINOPHIL # BLD AUTO: 0.16 K/UL
EOSINOPHIL NFR BLD AUTO: 2.3 %
GLUCOSE SERPL-MCNC: 107 MG/DL
HCT VFR BLD CALC: 45.3 %
HGB BLD-MCNC: 14.5 G/DL
IMM GRANULOCYTES NFR BLD AUTO: 0.1 %
INR PPP: 0.96 RATIO
LYMPHOCYTES # BLD AUTO: 2.13 K/UL
LYMPHOCYTES NFR BLD AUTO: 31.1 %
MAN DIFF?: NORMAL
MCHC RBC-ENTMCNC: 29.2 PG
MCHC RBC-ENTMCNC: 32 G/DL
MCV RBC AUTO: 91.3 FL
MONOCYTES # BLD AUTO: 0.45 K/UL
MONOCYTES NFR BLD AUTO: 6.6 %
NEUTROPHILS # BLD AUTO: 4.06 K/UL
NEUTROPHILS NFR BLD AUTO: 59.3 %
PLATELET # BLD AUTO: 210 K/UL
POTASSIUM SERPL-SCNC: 4.3 MMOL/L
PROT SERPL-MCNC: 7.8 G/DL
PT BLD: 11.1 SEC
RBC # BLD: 4.96 M/UL
RBC # FLD: 14.3 %
SODIUM SERPL-SCNC: 143 MMOL/L
WBC # FLD AUTO: 6.85 K/UL

## 2025-09-18 ENCOUNTER — NON-APPOINTMENT (OUTPATIENT)
Age: 80
End: 2025-09-18

## 2025-09-25 PROBLEM — I44.2 COMPLETE HEART BLOCK: Status: ACTIVE | Noted: 2025-09-25
